# Patient Record
Sex: FEMALE | Race: WHITE | Employment: FULL TIME | URBAN - METROPOLITAN AREA
[De-identification: names, ages, dates, MRNs, and addresses within clinical notes are randomized per-mention and may not be internally consistent; named-entity substitution may affect disease eponyms.]

---

## 2017-08-07 ENCOUNTER — HOSPITAL ENCOUNTER (OUTPATIENT)
Dept: MAMMOGRAPHY | Age: 47
Discharge: HOME | End: 2017-08-07
Attending: OBSTETRICS & GYNECOLOGY
Payer: COMMERCIAL

## 2017-08-07 DIAGNOSIS — Z12.31 VISIT FOR SCREENING MAMMOGRAM: ICD-10-CM

## 2017-08-07 PROCEDURE — 77067 SCR MAMMO BI INCL CAD: CPT

## 2018-01-10 ENCOUNTER — HOME HEALTH ADMISSION (OUTPATIENT)
Dept: HOME HEALTH SERVICES | Facility: HOME HEALTH | Age: 48
End: 2018-01-10
Payer: COMMERCIAL

## 2018-01-12 ENCOUNTER — HOME CARE VISIT (OUTPATIENT)
Dept: SCHEDULING | Facility: HOME HEALTH | Age: 48
End: 2018-01-12
Payer: COMMERCIAL

## 2018-01-12 VITALS
OXYGEN SATURATION: 98 % | TEMPERATURE: 98 F | DIASTOLIC BLOOD PRESSURE: 70 MMHG | RESPIRATION RATE: 18 BRPM | SYSTOLIC BLOOD PRESSURE: 110 MMHG | HEART RATE: 66 BPM

## 2018-01-12 PROCEDURE — G0151 HHCP-SERV OF PT,EA 15 MIN: HCPCS

## 2018-01-12 PROCEDURE — 400013 HH SOC

## 2018-01-15 ENCOUNTER — HOME CARE VISIT (OUTPATIENT)
Dept: SCHEDULING | Facility: HOME HEALTH | Age: 48
End: 2018-01-15
Payer: COMMERCIAL

## 2018-01-15 VITALS
OXYGEN SATURATION: 98 % | TEMPERATURE: 96.6 F | HEART RATE: 85 BPM | DIASTOLIC BLOOD PRESSURE: 89 MMHG | SYSTOLIC BLOOD PRESSURE: 125 MMHG

## 2018-01-15 PROCEDURE — G0151 HHCP-SERV OF PT,EA 15 MIN: HCPCS

## 2018-01-17 ENCOUNTER — HOME CARE VISIT (OUTPATIENT)
Dept: SCHEDULING | Facility: HOME HEALTH | Age: 48
End: 2018-01-17
Payer: COMMERCIAL

## 2018-01-17 VITALS
RESPIRATION RATE: 16 BRPM | OXYGEN SATURATION: 98 % | SYSTOLIC BLOOD PRESSURE: 135 MMHG | TEMPERATURE: 96.5 F | DIASTOLIC BLOOD PRESSURE: 85 MMHG | HEART RATE: 90 BPM

## 2018-01-17 PROCEDURE — G0151 HHCP-SERV OF PT,EA 15 MIN: HCPCS

## 2018-01-22 ENCOUNTER — HOME CARE VISIT (OUTPATIENT)
Dept: SCHEDULING | Facility: HOME HEALTH | Age: 48
End: 2018-01-22
Payer: COMMERCIAL

## 2018-01-22 VITALS
TEMPERATURE: 96 F | DIASTOLIC BLOOD PRESSURE: 84 MMHG | SYSTOLIC BLOOD PRESSURE: 127 MMHG | OXYGEN SATURATION: 98 % | RESPIRATION RATE: 16 BRPM | HEART RATE: 76 BPM

## 2018-01-22 PROCEDURE — G0151 HHCP-SERV OF PT,EA 15 MIN: HCPCS

## 2018-01-23 PROCEDURE — A4649 SURGICAL SUPPLIES: HCPCS

## 2018-01-24 ENCOUNTER — HOME CARE VISIT (OUTPATIENT)
Dept: SCHEDULING | Facility: HOME HEALTH | Age: 48
End: 2018-01-24
Payer: COMMERCIAL

## 2018-01-24 VITALS
TEMPERATURE: 96.2 F | HEART RATE: 76 BPM | DIASTOLIC BLOOD PRESSURE: 83 MMHG | SYSTOLIC BLOOD PRESSURE: 113 MMHG | OXYGEN SATURATION: 98 % | RESPIRATION RATE: 16 BRPM

## 2018-01-24 PROCEDURE — G0151 HHCP-SERV OF PT,EA 15 MIN: HCPCS

## 2018-01-26 ENCOUNTER — HOME CARE VISIT (OUTPATIENT)
Dept: SCHEDULING | Facility: HOME HEALTH | Age: 48
End: 2018-01-26
Payer: COMMERCIAL

## 2018-01-26 VITALS
DIASTOLIC BLOOD PRESSURE: 83 MMHG | HEART RATE: 77 BPM | TEMPERATURE: 96.4 F | OXYGEN SATURATION: 97 % | SYSTOLIC BLOOD PRESSURE: 135 MMHG | RESPIRATION RATE: 16 BRPM

## 2018-01-26 PROCEDURE — G0151 HHCP-SERV OF PT,EA 15 MIN: HCPCS

## 2018-01-29 ENCOUNTER — HOME CARE VISIT (OUTPATIENT)
Dept: SCHEDULING | Facility: HOME HEALTH | Age: 48
End: 2018-01-29
Payer: COMMERCIAL

## 2018-01-29 VITALS
RESPIRATION RATE: 16 BRPM | DIASTOLIC BLOOD PRESSURE: 80 MMHG | TEMPERATURE: 96.9 F | OXYGEN SATURATION: 99 % | SYSTOLIC BLOOD PRESSURE: 103 MMHG | HEART RATE: 73 BPM

## 2018-01-29 PROCEDURE — G0151 HHCP-SERV OF PT,EA 15 MIN: HCPCS

## 2018-01-31 ENCOUNTER — HOME CARE VISIT (OUTPATIENT)
Dept: SCHEDULING | Facility: HOME HEALTH | Age: 48
End: 2018-01-31
Payer: COMMERCIAL

## 2018-01-31 VITALS
HEART RATE: 83 BPM | TEMPERATURE: 97 F | OXYGEN SATURATION: 98 % | SYSTOLIC BLOOD PRESSURE: 113 MMHG | RESPIRATION RATE: 16 BRPM | DIASTOLIC BLOOD PRESSURE: 83 MMHG

## 2018-01-31 PROCEDURE — A4649 SURGICAL SUPPLIES: HCPCS

## 2018-01-31 PROCEDURE — G0151 HHCP-SERV OF PT,EA 15 MIN: HCPCS

## 2018-02-01 NOTE — THERAPY EVALUATION
Klever Baez  : 1970  Primary: [employer]  Secondary:  [address] at [place] [id number] [facility], [address] [place] [address],[address], [address] [facility]  [phone]   [phone]          OUTPATIENT PHYSICAL THERAPY:Initial Assessment 2018    ICD-10: Treatment Diagnosis:     · Pain in right knee (M25.561)  · Stiffness of right knee, not elsewhere classified (M25.661)      Precautions/Allergies:   Review of patient's allergies indicates no known allergies. Fall Risk Score: 0 (? 5 = High Risk)  MD Orders: eval and treat MEDICAL/REFERRING DIAGNOSIS:  degenerative joint disease of lower leg   DATE OF ONSET: 3 weeks ago  REFERRING PHYSICIAN: Nora Dorsey MD  RETURN PHYSICIAN APPOINTMENT: unsure date     INITIAL ASSESSMENT:  Ms. Roldan Ear presents s/p R TKA. She is a very active person who will benefit from PT services to get back to full I with work, family, and recreational activities. PROBLEM LIST (Impacting functional limitations):  1. Decreased Strength  2. Decreased ADL/Functional Activities  3. Decreased Ambulation Ability/Technique  4. Increased Pain  5. Decreased Activity Tolerance  6. Decreased Flexibility/Joint Mobility  7. Decreased [place] with Home Exercise Program INTERVENTIONS PLANNED:  1. Cold  2. Cryotherapy  3. Electrical Stimulation  4. Gait Training  5. Heat  6. Home Exercise Program (HEP)  7. Manual Therapy  8. Range of Motion (ROM)  9. Therapeutic Activites  10. Therapeutic Exercise/Strengthening  11. Ultrasound (US)   TREATMENT PLAN:  Effective Dates: 18 TO 18. Frequency/Duration: 2 times a week for 8 weeks  GOALS: (Goals have been discussed and agreed upon with patient.)  Short-Term Functional Goals: Time Frame: 4 weeks  1. Pt will be I with phase appropriate HEP to assist with R ROM surgical knee. 2. Pt will improve extension knee to 0 degrees to assist with gait. Discharge Goals: Time Frame: 8 weeks  1.  Pt will improve R knee flexion to 125 degrees or greater to assist with stair climbing. 2. Pt will demonstrate an non-antalgic gait pattern without assistive device on level and uneven surfaces SPV. 3. Pt  Will improve strength R quads and hamstrings to 5/5 to assist with return to recreational activities. Rehabilitation Potential For Stated Goals: Good  Regarding Robert Montejo's therapy, I certify that the treatment plan above will be carried out by a therapist or under their direction. Thank you for this referral,  Cosme Ferguson PT     Referring Physician Signature: Edyta Francois MD              Date                    The information in this section was collected on 18 (except where otherwise noted). HISTORY:   History of Present Injury/Illness (Reason for Referral):  R TKA secondary to OA knee  Past Medical History/Comorbidities:   Ms. Subha Stevenson  has a past medical history of Allergic rhinitis; Arthritis; GERD (gastroesophageal reflux disease); and Laryngeal ulceration. Ms. Subha Stevenson  has a past surgical history that includes hx  section (); hx abdominal wall defect repair; and implant breast silicone/eq. Social History/Living Environment:     very active person with family living in private residence  Prior Level of Function/Work/Activity:  I all activities   Personal Factors:          Sex:  female        Age:  49 y.o. Overall Behavior:  motivated  Current Medications:       Current Outpatient Prescriptions:     ascorbic acid, vitamin C, (VITAMIN C) 500 mg tablet, Take 500 mg by mouth two (2) times a day., Disp: , Rfl:     aspirin delayed-release 81 mg tablet, Take 81 mg by mouth two (2) times a day., Disp: , Rfl:     acetaminophen (TYLENOL) 325 mg tablet, Take 325 mg by mouth every six (6) hours as needed for Pain., Disp: , Rfl:     docusate sodium (COLACE) 100 mg capsule, Take 100 mg by mouth daily. , Disp: , Rfl:     senna (SENNA) 8.6 mg tablet, Take 1 Tab by mouth daily., Disp: , Rfl:     traMADol (ULTRAM) 50 mg tablet, Take 50 mg by mouth every six (6) hours as needed for Pain., Disp: , Rfl:     folic acid (FOLVITE) 1 mg tablet, Take 1 mg by mouth daily. , Disp: , Rfl:     iron polysacch complex-b12-fa (FERREX 150 FORTE) capsule, Take 1 Cap by mouth daily. , Disp: , Rfl:     predniSONE (DELTASONE) 10 mg tablet, 4 PO QD x 2, 3 PO QD x 2, 2 PO QD x 2, 1 PO QD x 2, Disp: 20 Tab, Rfl: 0    HYDROcodone-homatropine (HYCODAN) 5-1.5 mg/5 mL (5 mL) syrup, Take 5 mL by mouth four (4) times daily. Max Daily Amount: 20 mL., Disp: 120 mL, Rfl: 0    omeprazole (PRILOSEC) 40 mg capsule, Take 1 Cap by mouth daily. , Disp: 90 Cap, Rfl: 3    diclofenac-misoprostol (ARTHROTEC 50)  mg-mcg per tablet, Take 1 Tab by mouth two (2) times a day. Indications: OSTEOARTHRITIS IN PATIENT AT HIGH GASTRIC ULCER RISK, Disp: 60 Tab, Rfl: 5    diphenhydrAMINE (BENADRYL ALLERGY) 25 mg tablet, Take 25 mg by mouth every six (6) hours as needed for Sleep., Disp: , Rfl:    Date Last Reviewed:  2/1/2018    Number of Personal Factors/Comorbidities that affect the Plan of Care: 0: LOW COMPLEXITY   EXAMINATION:   Observation/Orthostatic Postural Assessment:          Edema present: R knee above patella R 16, L 15, mid patella R 17.5, L 16, below patella R 16, L 15  ROM:          Knee:  Right: 5 to 90 from supine prior to treatment; 3 to 120 sitting after treatment  Left: 0 to 127  Strength:          Lower extremities:  Quads 5 degrees extensor lag R, L 5/5  Hamstrings 3+/5 R, 5/5 L  Hip ABD 4-/5 R, 5/5 L  gastocs 4-/5 R, 5/5 L  Functional Mobility:         Gait/Ambulation:  Using straight cane L hand. Antalgic gait, circumduct R LE swing with shortened stance phase R        Transfers:  Shifts L         Stairs:  Not tested today   Body Structures Involved:  1. Bones  2. Joints  3. Muscles  4. Ligaments Body Functions Affected:  1. Sensory/Pain  2.  Neuromusculoskeletal Activities and Participation Affected:  1. Mobility  2. Self Care  3. Domestic Life  4. Interpersonal Interactions and Relationships  5. Community, Social and [place]   Number of elements (examined above) that affect the Plan of Care: 3: MODERATE COMPLEXITY   CLINICAL PRESENTATION:   Presentation: Stable and uncomplicated: LOW COMPLEXITY   CLINICAL DECISION MAKING:   Outcome Measure: Tool Used: Lower Extremity Functional Scale (LEFS)  Score:  Initial: 28/80 Most Recent: X/80 (Date: -- )   Interpretation of Score: 20 questions each scored on a 5 point scale with 0 representing \"extreme difficulty or unable to perform\" and 4 representing \"no difficulty\". The lower the score, the greater the functional disability. 80/80 represents no disability. Minimal detectable change is 9 points. Score 80 79-63 62-48 47-32 31-16 15-1 0   Modifier CH CI CJ CK CL CM CN         Medical Necessity:   · Patient is expected to demonstrate progress in strength, range of motion and functional technique to increase ease with all functional activities. Reason for Services/Other Comments:  · Patient continues to require skilled intervention due to recent TKA. Use of outcome tool(s) and clinical judgement create a POC that gives a: Clear prediction of patient's progress: LOW COMPLEXITY            TREATMENT:   (In addition to Assessment/Re-Assessment sessions the following treatments were rendered)  Pre-treatment Symptoms/Complaints:  6/10 R knee  Pain: Initial:     6/10 R knee Post Session:  6/10 R knee     THERAPEUTIC EXERCISE: (15 minutes):  Exercises per grid below to improve mobility and strength. Required minimal visual, verbal and tactile cues to promote proper body alignment, promote proper body posture and promote proper body mechanics. Progressed resistance, range and repetitions as indicated.    Date:  2-2-18 Date:   Date:     Activity/Exercise Parameters Parameters Parameters   Seated knee flexion with over pressure 10sx10     Prone knee extension 10sx10     Quad sets 10sx10     SLR x20                        Manual: (15 minutes): mobilization to promote r knee flexion and extension, soft tissue hamstring, gastroc to improve muscle flexibility. [employer] Portal  Treatment/Session Assessment:    · Response to Treatment:  Initial evaluation complete. Pt verbalized understanding HEP. · Compliance with Program/Exercises: Will assess as treatment progresses. · Recommendations/Intent for next treatment session: \"Next visit will focus on advancements to more challenging activities\".   Total Treatment Duration:   10:30 to 11:30    Yusra Leroy, PT

## 2018-02-02 ENCOUNTER — HOSPITAL ENCOUNTER (OUTPATIENT)
Dept: PHYSICAL THERAPY | Age: 48
Discharge: HOME | End: 2018-02-02
Payer: COMMERCIAL

## 2018-02-02 PROCEDURE — 97110 THERAPEUTIC EXERCISES: CPT

## 2018-02-02 PROCEDURE — 97161 PT EVAL LOW COMPLEX 20 MIN: CPT

## 2018-02-05 PROCEDURE — A4649 SURGICAL SUPPLIES: HCPCS

## 2018-02-06 ENCOUNTER — HOSPITAL ENCOUNTER (OUTPATIENT)
Dept: PHYSICAL THERAPY | Age: 48
Discharge: HOME | End: 2018-02-06
Payer: COMMERCIAL

## 2018-02-06 PROCEDURE — 97140 MANUAL THERAPY 1/> REGIONS: CPT

## 2018-02-06 PROCEDURE — 97110 THERAPEUTIC EXERCISES: CPT

## 2018-02-06 NOTE — PROGRESS NOTES
Yates Angelucci  : 1970  Primary: [employer]  Secondary:  [address]  at [facility] [address] [place] [address], [facility]. [address].  [phone]   [phone]          OUTPATIENT PHYSICAL THERAPY:Daily Note 2018    ICD-10: Treatment Diagnosis:   · Pain in right knee (M25.561)  · Stiffness of right knee, not elsewhere classified (M25.661)      Precautions/Allergies:   Review of patient's allergies indicates no known allergies. Fall Risk Score: 0 (? 5 = High Risk)  MD Orders: eval and treat MEDICAL/REFERRING DIAGNOSIS:  degenerative joint disease of lower leg   DATE OF ONSET: 3 weeks ago  REFERRING PHYSICIAN: Selvin Guerra MD  RETURN PHYSICIAN APPOINTMENT: unsure date     INITIAL ASSESSMENT:  Ms. Siva Antoine presents s/p R TKA. She is a very active person who will benefit from PT services to get back to full I with work, family, and recreational activities. PROBLEM LIST (Impacting functional limitations):  1. Decreased Strength  2. Decreased ADL/Functional Activities  3. Decreased Ambulation Ability/Technique  4. Increased Pain  5. Decreased Activity Tolerance  6. Decreased Flexibility/Joint Mobility  7. Decreased [place] with Home Exercise Program INTERVENTIONS PLANNED:  1. Cold  2. Cryotherapy  3. Electrical Stimulation  4. Gait Training  5. Heat  6. Home Exercise Program (HEP)  7. Manual Therapy  8. Range of Motion (ROM)  9. Therapeutic Activites  10. Therapeutic Exercise/Strengthening  11. Ultrasound (US)   TREATMENT PLAN:  Effective Dates: 18 TO 18. Frequency/Duration: 2 times a week for 8 weeks  GOALS: (Goals have been discussed and agreed upon with patient.)  Short-Term Functional Goals: Time Frame: 4 weeks  1. Pt will be I with phase appropriate HEP to assist with R ROM surgical knee. 2. Pt will improve extension knee to 0 degrees to assist with gait. Discharge Goals: Time Frame: 8 weeks  1.  Pt will improve R knee flexion to 125 degrees or greater to assist with stair climbing. 2. Pt will demonstrate an non-antalgic gait pattern without assistive device on level and uneven surfaces SPV. 3. Pt  Will improve strength R quads and hamstrings to 5/5 to assist with return to recreational activities. Rehabilitation Potential For Stated Goals: Good  Regarding Mikhail Montejo's therapy, I certify that the treatment plan above will be carried out by a therapist or under their direction. Thank you for this referral,  Leslye Campbell PT     Referring Physician Signature: Tianna Shen MD              Date                    The information in this section was collected on 18 (except where otherwise noted). HISTORY:   History of Present Injury/Illness (Reason for Referral):  R TKA secondary to OA knee  Past Medical History/Comorbidities:   Ms. Sree Pritchard  has a past medical history of Allergic rhinitis; Arthritis; GERD (gastroesophageal reflux disease); and Laryngeal ulceration. Ms. Sree Pritchard  has a past surgical history that includes hx  section (); hx abdominal wall defect repair; and implant breast silicone/eq. Social History/Living Environment:     very active person with family living in private residence  Prior Level of Function/Work/Activity:  I all activities   Personal Factors:          Sex:  female        Age:  49 y.o. Overall Behavior:  motivated  Current Medications:       Current Outpatient Prescriptions:     ascorbic acid, vitamin C, (VITAMIN C) 500 mg tablet, Take 500 mg by mouth two (2) times a day., Disp: , Rfl:     aspirin delayed-release 81 mg tablet, Take 81 mg by mouth two (2) times a day., Disp: , Rfl:     acetaminophen (TYLENOL) 325 mg tablet, Take 325 mg by mouth every six (6) hours as needed for Pain., Disp: , Rfl:     docusate sodium (COLACE) 100 mg capsule, Take 100 mg by mouth daily. , Disp: , Rfl:     senna (SENNA) 8.6 mg tablet, Take 1 Tab by mouth daily. , Disp: , Rfl:     traMADol (ULTRAM) 50 mg tablet, Take 50 mg by mouth every six (6) hours as needed for Pain., Disp: , Rfl:     folic acid (FOLVITE) 1 mg tablet, Take 1 mg by mouth daily. , Disp: , Rfl:     iron polysacch complex-b12-fa (FERREX 150 FORTE) capsule, Take 1 Cap by mouth daily. , Disp: , Rfl:     predniSONE (DELTASONE) 10 mg tablet, 4 PO QD x 2, 3 PO QD x 2, 2 PO QD x 2, 1 PO QD x 2, Disp: 20 Tab, Rfl: 0    HYDROcodone-homatropine (HYCODAN) 5-1.5 mg/5 mL (5 mL) syrup, Take 5 mL by mouth four (4) times daily. Max Daily Amount: 20 mL., Disp: 120 mL, Rfl: 0    omeprazole (PRILOSEC) 40 mg capsule, Take 1 Cap by mouth daily. , Disp: 90 Cap, Rfl: 3    diclofenac-misoprostol (ARTHROTEC 50)  mg-mcg per tablet, Take 1 Tab by mouth two (2) times a day. Indications: OSTEOARTHRITIS IN PATIENT AT HIGH GASTRIC ULCER RISK, Disp: 60 Tab, Rfl: 5    diphenhydrAMINE (BENADRYL ALLERGY) 25 mg tablet, Take 25 mg by mouth every six (6) hours as needed for Sleep., Disp: , Rfl:    Date Last Reviewed:  2/6/2018    Number of Personal Factors/Comorbidities that affect the Plan of Care: 0: LOW COMPLEXITY   EXAMINATION:   Observation/Orthostatic Postural Assessment:          Edema present: R knee above patella R 16, L 15, mid patella R 17.5, L 16, below patella R 16, L 15  ROM:          Knee:  Right: 5 to 90 from supine prior to treatment; 3 to 120 sitting after treatment  Left: 0 to 127  Strength:          Lower extremities:  Quads 5 degrees extensor lag R, L 5/5  Hamstrings 3+/5 R, 5/5 L  Hip ABD 4-/5 R, 5/5 L  gastocs 4-/5 R, 5/5 L  Functional Mobility:         Gait/Ambulation:  Using straight cane L hand. Antalgic gait, circumduct R LE swing with shortened stance phase R        Transfers:  Shifts L         Stairs:  Not tested today   Body Structures Involved:  1. Bones  2. Joints  3. Muscles  4. Ligaments Body Functions Affected:  1. Sensory/Pain  2.  Neuromusculoskeletal Activities and Participation Affected:  1. Mobility  2. Self Care  3. Domestic Life  4. Interpersonal Interactions and Relationships  5. Community, Social and [place]   Number of elements (examined above) that affect the Plan of Care: 3: MODERATE COMPLEXITY   CLINICAL PRESENTATION:   Presentation: Stable and uncomplicated: LOW COMPLEXITY   CLINICAL DECISION MAKING:   Outcome Measure: Tool Used: Lower Extremity Functional Scale (LEFS)  Score:  Initial: 28/80 Most Recent: X/80 (Date: -- )   Interpretation of Score: 20 questions each scored on a 5 point scale with 0 representing \"extreme difficulty or unable to perform\" and 4 representing \"no difficulty\". The lower the score, the greater the functional disability. 80/80 represents no disability. Minimal detectable change is 9 points. Score 80 79-63 62-48 47-32 31-16 15-1 0   Modifier CH CI CJ CK CL CM CN         Medical Necessity:   · Patient is expected to demonstrate progress in strength, range of motion and functional technique to increase ease with all functional activities. Reason for Services/Other Comments:  · Patient continues to require skilled intervention due to recent TKA. Use of outcome tool(s) and clinical judgement create a POC that gives a: Clear prediction of patient's progress: LOW COMPLEXITY            TREATMENT:   (In addition to Assessment/Re-Assessment sessions the following treatments were rendered)  Pre-treatment Symptoms/Complaints:  6/10 R knee  Pain: Initial:     6/10 R knee Post Session:  6/10 R knee     THERAPEUTIC EXERCISE: (20 minutes):  Exercises per grid below to improve mobility and strength. Required minimal visual, verbal and tactile cues to promote proper body alignment, promote proper body posture and promote proper body mechanics. Progressed resistance, range and repetitions as indicated.    Date:  2-2-18 Date:  2-6-18 Date:     Activity/Exercise Parameters Parameters Parameters   Seated knee flexion with over pressure 10sx10 10sx15    Prone knee extension  10sx10 10sx10    Quad sets 10sx10 10sx10    SLR all 4 planes x20 3x10    Nustep  8 min L2                 Manual: (25 minutes): mobilization to promote r knee flexion and extension, soft tissue hamstring, gastroc to improve muscle flexibility. Modalities: (10 minutes): pneumatic ice machine L knee. Skin intact following. [employer] Portal  Treatment/Session Assessment:    · Response to Treatment:  Pt verbalized understanding HEP. · Compliance with Program/Exercises: Will assess as treatment progresses. · Recommendations/Intent for next treatment session: \"Next visit will focus on advancements to more challenging activities\".   Total Treatment Duration:  PT Patient Time In/Time Out  Time In: 0985  Time Out: Kathleen Silva., PT

## 2018-02-08 ENCOUNTER — HOSPITAL ENCOUNTER (OUTPATIENT)
Dept: PHYSICAL THERAPY | Age: 48
Discharge: HOME | End: 2018-02-08
Payer: COMMERCIAL

## 2018-02-08 PROCEDURE — 97140 MANUAL THERAPY 1/> REGIONS: CPT

## 2018-02-08 PROCEDURE — 97110 THERAPEUTIC EXERCISES: CPT

## 2018-02-08 NOTE — PROGRESS NOTES
Heywood Barthel  : 1970  Primary: [employer]  Secondary:  [address] at [place] [id number] [facility], [address] [place] [address],[address], [address] [facility]  [phone]   [phone]          OUTPATIENT PHYSICAL THERAPY:Daily Note 2018    ICD-10: Treatment Diagnosis:   · Pain in right knee (M25.561)  · Stiffness of right knee, not elsewhere classified (M25.661)      Precautions/Allergies:   Review of patient's allergies indicates no known allergies. Fall Risk Score: 0 (? 5 = High Risk)  MD Orders: eval and treat MEDICAL/REFERRING DIAGNOSIS:  degenerative joint disease of lower leg   DATE OF ONSET: 3 weeks ago  REFERRING PHYSICIAN: Courtney Tanner MD  RETURN PHYSICIAN APPOINTMENT: unsure date     INITIAL ASSESSMENT:  Ms. Celestine Meléndez presents s/p R TKA. She is a very active person who will benefit from PT services to get back to full I with work, family, and recreational activities. PROBLEM LIST (Impacting functional limitations):  1. Decreased Strength  2. Decreased ADL/Functional Activities  3. Decreased Ambulation Ability/Technique  4. Increased Pain  5. Decreased Activity Tolerance  6. Decreased Flexibility/Joint Mobility  7. Decreased [place] with Home Exercise Program INTERVENTIONS PLANNED:  1. Cold  2. Cryotherapy  3. Electrical Stimulation  4. Gait Training  5. Heat  6. Home Exercise Program (HEP)  7. Manual Therapy  8. Range of Motion (ROM)  9. Therapeutic Activites  10. Therapeutic Exercise/Strengthening  11. Ultrasound (US)   TREATMENT PLAN:  Effective Dates: 18 TO 18. Frequency/Duration: 2 times a week for 8 weeks  GOALS: (Goals have been discussed and agreed upon with patient.)  Short-Term Functional Goals: Time Frame: 4 weeks  1. Pt will be I with phase appropriate HEP to assist with R ROM surgical knee. 2. Pt will improve extension knee to 0 degrees to assist with gait. Discharge Goals: Time Frame: 8 weeks  1.  Pt will improve R knee flexion to 125 degrees or greater to assist with stair climbing. 2. Pt will demonstrate an non-antalgic gait pattern without assistive device on level and uneven surfaces SPV. 3. Pt  Will improve strength R quads and hamstrings to 5/5 to assist with return to recreational activities. Rehabilitation Potential For Stated Goals: Good  Regarding Jailyn Montejo's therapy, I certify that the treatment plan above will be carried out by a therapist or under their direction. Thank you for this referral,  Porsha Shaver PT     Referring Physician Signature: Seda Mortensen MD              Date                    The information in this section was collected on 18 (except where otherwise noted). HISTORY:   History of Present Injury/Illness (Reason for Referral):  R TKA secondary to OA knee  Past Medical History/Comorbidities:   Ms. Shar Leach  has a past medical history of Allergic rhinitis; Arthritis; GERD (gastroesophageal reflux disease); and Laryngeal ulceration. Ms. Shar Leach  has a past surgical history that includes hx  section (); hx abdominal wall defect repair; and implant breast silicone/eq. Social History/Living Environment:     very active person with family living in private residence  Prior Level of Function/Work/Activity:  I all activities   Personal Factors:          Sex:  female        Age:  49 y.o. Overall Behavior:  motivated  Current Medications:       Current Outpatient Prescriptions:     ascorbic acid, vitamin C, (VITAMIN C) 500 mg tablet, Take 500 mg by mouth two (2) times a day., Disp: , Rfl:     aspirin delayed-release 81 mg tablet, Take 81 mg by mouth two (2) times a day., Disp: , Rfl:     acetaminophen (TYLENOL) 325 mg tablet, Take 325 mg by mouth every six (6) hours as needed for Pain., Disp: , Rfl:     docusate sodium (COLACE) 100 mg capsule, Take 100 mg by mouth daily. , Disp: , Rfl:     senna (SENNA) 8.6 mg tablet, Take 1 Tab by mouth daily. , Disp: , Rfl:     traMADol (ULTRAM) 50 mg tablet, Take 50 mg by mouth every six (6) hours as needed for Pain., Disp: , Rfl:     folic acid (FOLVITE) 1 mg tablet, Take 1 mg by mouth daily. , Disp: , Rfl:     iron polysacch complex-b12-fa (FERREX 150 FORTE) capsule, Take 1 Cap by mouth daily. , Disp: , Rfl:     predniSONE (DELTASONE) 10 mg tablet, 4 PO QD x 2, 3 PO QD x 2, 2 PO QD x 2, 1 PO QD x 2, Disp: 20 Tab, Rfl: 0    HYDROcodone-homatropine (HYCODAN) 5-1.5 mg/5 mL (5 mL) syrup, Take 5 mL by mouth four (4) times daily. Max Daily Amount: 20 mL., Disp: 120 mL, Rfl: 0    omeprazole (PRILOSEC) 40 mg capsule, Take 1 Cap by mouth daily. , Disp: 90 Cap, Rfl: 3    diclofenac-misoprostol (ARTHROTEC 50)  mg-mcg per tablet, Take 1 Tab by mouth two (2) times a day. Indications: OSTEOARTHRITIS IN PATIENT AT HIGH GASTRIC ULCER RISK, Disp: 60 Tab, Rfl: 5    diphenhydrAMINE (BENADRYL ALLERGY) 25 mg tablet, Take 25 mg by mouth every six (6) hours as needed for Sleep., Disp: , Rfl:    Date Last Reviewed:  2/8/2018    Number of Personal Factors/Comorbidities that affect the Plan of Care: 0: LOW COMPLEXITY   EXAMINATION:   Observation/Orthostatic Postural Assessment:          Edema present: R knee above patella R 16, L 15, mid patella R 17.5, L 16, below patella R 16, L 15  ROM:          Knee:  Right: 5 to 90 from supine prior to treatment; 3 to 120 sitting after treatment  Left: 0 to 127  Strength:          Lower extremities:  Quads 5 degrees extensor lag R, L 5/5  Hamstrings 3+/5 R, 5/5 L  Hip ABD 4-/5 R, 5/5 L  gastocs 4-/5 R, 5/5 L  Functional Mobility:         Gait/Ambulation:  Using straight cane L hand. Antalgic gait, circumduct R LE swing with shortened stance phase R        Transfers:  Shifts L         Stairs:  Not tested today   Body Structures Involved:  1. Bones  2. Joints  3. Muscles  4. Ligaments Body Functions Affected:  1. Sensory/Pain  2.  Neuromusculoskeletal Activities and Participation Affected:  1. Mobility  2. Self Care  3. Domestic Life  4. Interpersonal Interactions and Relationships  5. Community, Social and [place]   Number of elements (examined above) that affect the Plan of Care: 3: MODERATE COMPLEXITY   CLINICAL PRESENTATION:   Presentation: Stable and uncomplicated: LOW COMPLEXITY   CLINICAL DECISION MAKING:   Outcome Measure: Tool Used: Lower Extremity Functional Scale (LEFS)  Score:  Initial: 28/80 Most Recent: X/80 (Date: -- )   Interpretation of Score: 20 questions each scored on a 5 point scale with 0 representing \"extreme difficulty or unable to perform\" and 4 representing \"no difficulty\". The lower the score, the greater the functional disability. 80/80 represents no disability. Minimal detectable change is 9 points. Score 80 79-63 62-48 47-32 31-16 15-1 0   Modifier CH CI CJ CK CL CM CN         Medical Necessity:   · Patient is expected to demonstrate progress in strength, range of motion and functional technique to increase ease with all functional activities. Reason for Services/Other Comments:  · Patient continues to require skilled intervention due to recent TKA. Use of outcome tool(s) and clinical judgement create a POC that gives a: Clear prediction of patient's progress: LOW COMPLEXITY            TREATMENT:   (In addition to Assessment/Re-Assessment sessions the following treatments were rendered)  Pre-treatment Symptoms/Complaints:  6/10 R knee  Pain: Initial:     6/10 R knee Post Session:  6/10 R knee     THERAPEUTIC EXERCISE: (20 minutes):  Exercises per grid below to improve mobility and strength. Required minimal visual, verbal and tactile cues to promote proper body alignment, promote proper body posture and promote proper body mechanics. Progressed resistance, range and repetitions as indicated.    Date:  2-8-18 Date:     Activity/Exercise Parameters Parameters   Seated knee flexion with over pressure 10sx15    Prone knee extension  10sx10    Quad sets 10sx10    SLR all 4 planes 3x10    Nustep 8 min L2               Manual: (25 minutes): mobilization to promote r knee flexion and extension, soft tissue hamstring, gastroc to improve muscle flexibility. Modalities: (10 minutes): pneumatic ice machine L knee. Skin intact following. [employer] [facility]  Treatment/Session Assessment:    · Response to Treatment:  Pt verbalized understanding HEP. · Compliance with Program/Exercises: compliant. · Recommendations/Intent for next treatment session: \"Next visit will focus on advancements to more challenging activities\".   Total Treatment Duration:  PT Patient Time In/Time Out  Time In: 0945  Time Out: FERNANDEZ Avelar

## 2018-02-13 ENCOUNTER — HOSPITAL ENCOUNTER (OUTPATIENT)
Dept: PHYSICAL THERAPY | Age: 48
Discharge: HOME | End: 2018-02-13
Payer: COMMERCIAL

## 2018-02-13 PROCEDURE — 97110 THERAPEUTIC EXERCISES: CPT

## 2018-02-13 PROCEDURE — 97140 MANUAL THERAPY 1/> REGIONS: CPT

## 2018-02-13 NOTE — PROGRESS NOTES
Emanuel Day  : 1970  Primary: [employer]  Secondary:  [address] at [place] [id number] [facility], [address], [facility] [address]  [phone]   [phone]          OUTPATIENT PHYSICAL THERAPY:Daily Note 2018    ICD-10: Treatment Diagnosis:   Precautions/Allergies:   Review of patient's allergies indicates no known allergies. Fall Risk Score: 0 (? 5 = High Risk)  MD Orders: eval and treat MEDICAL/REFERRING DIAGNOSIS:  degenerative joint disease of lower leg   DATE OF ONSET: 3 weeks ago  REFERRING PHYSICIAN: Tressa Ward MD  RETURN PHYSICIAN APPOINTMENT: unsure date     INITIAL ASSESSMENT:  Ms. Blanca Carter presents s/p R TKA. She is a very active person who will benefit from PT services to get back to full I with work, family, and recreational activities. PROBLEM LIST (Impacting functional limitations):  1. Decreased Strength  2. Decreased ADL/Functional Activities  3. Decreased Ambulation Ability/Technique  4. Increased Pain  5. Decreased Activity Tolerance  6. Decreased Flexibility/Joint Mobility  7. Decreased [place] with Home Exercise Program INTERVENTIONS PLANNED:  1. Cold  2. Cryotherapy  3. Electrical Stimulation  4. Gait Training  5. Heat  6. Home Exercise Program (HEP)  7. Manual Therapy  8. Range of Motion (ROM)  9. Therapeutic Activites  10. Therapeutic Exercise/Strengthening  11. Ultrasound (US)   TREATMENT PLAN:  Effective Dates: 18 TO 18. Frequency/Duration: 2 times a week for 8 weeks  GOALS: (Goals have been discussed and agreed upon with patient.)  Short-Term Functional Goals: Time Frame: 4 weeks  1. Pt will be I with phase appropriate HEP to assist with R ROM surgical knee. 2. Pt will improve extension knee to 0 degrees to assist with gait. Discharge Goals: Time Frame: 8 weeks  1. Pt will improve R knee flexion to 125 degrees or greater to assist with stair climbing.   2. Pt will demonstrate an non-antalgic gait pattern without assistive device on level and uneven surfaces SPV. 3. Pt  Will improve strength R quads and hamstrings to 5/5 to assist with return to recreational activities. Rehabilitation Potential For Stated Goals: Good  Regarding Dariana Montejo's therapy, I certify that the treatment plan above will be carried out by a therapist or under their direction. Thank you for this referral,  Kamron Hill PT     Referring Physician Signature: Osbaldo Conner MD              Date                    The information in this section was collected on 18 (except where otherwise noted). HISTORY:   History of Present Injury/Illness (Reason for Referral):  R TKA secondary to OA knee  Past Medical History/Comorbidities:   Ms. Curtis Flores  has a past medical history of Allergic rhinitis; Arthritis; GERD (gastroesophageal reflux disease); and Laryngeal ulceration. Ms. Curtis Flores  has a past surgical history that includes hx  section (); hx abdominal wall defect repair; and implant breast silicone/eq. Social History/Living Environment:     very active person with family living in private residence  Prior Level of Function/Work/Activity:  I all activities   Personal Factors:          Sex:  female        Age:  49 y.o. Overall Behavior:  motivated  Current Medications:       Current Outpatient Prescriptions:     ascorbic acid, vitamin C, (VITAMIN C) 500 mg tablet, Take 500 mg by mouth two (2) times a day., Disp: , Rfl:     aspirin delayed-release 81 mg tablet, Take 81 mg by mouth two (2) times a day., Disp: , Rfl:     acetaminophen (TYLENOL) 325 mg tablet, Take 325 mg by mouth every six (6) hours as needed for Pain., Disp: , Rfl:     docusate sodium (COLACE) 100 mg capsule, Take 100 mg by mouth daily. , Disp: , Rfl:     senna (SENNA) 8.6 mg tablet, Take 1 Tab by mouth daily. , Disp: , Rfl:     traMADol (ULTRAM) 50 mg tablet, Take 50 mg by mouth every six (6) hours as needed for Pain., Disp: , Rfl:     folic acid (FOLVITE) 1 mg tablet, Take 1 mg by mouth daily. , Disp: , Rfl:     iron polysacch complex-b12-fa (FERREX 150 FORTE) capsule, Take 1 Cap by mouth daily. , Disp: , Rfl:     predniSONE (DELTASONE) 10 mg tablet, 4 PO QD x 2, 3 PO QD x 2, 2 PO QD x 2, 1 PO QD x 2, Disp: 20 Tab, Rfl: 0    HYDROcodone-homatropine (HYCODAN) 5-1.5 mg/5 mL (5 mL) syrup, Take 5 mL by mouth four (4) times daily. Max Daily Amount: 20 mL., Disp: 120 mL, Rfl: 0    omeprazole (PRILOSEC) 40 mg capsule, Take 1 Cap by mouth daily. , Disp: 90 Cap, Rfl: 3    diclofenac-misoprostol (ARTHROTEC 50)  mg-mcg per tablet, Take 1 Tab by mouth two (2) times a day. Indications: OSTEOARTHRITIS IN PATIENT AT HIGH GASTRIC ULCER RISK, Disp: 60 Tab, Rfl: 5    diphenhydrAMINE (BENADRYL ALLERGY) 25 mg tablet, Take 25 mg by mouth every six (6) hours as needed for Sleep., Disp: , Rfl:    Date Last Reviewed:  2/13/2018    Number of Personal Factors/Comorbidities that affect the Plan of Care: 0: LOW COMPLEXITY   EXAMINATION:   Observation/Orthostatic Postural Assessment:          Edema present: R knee above patella R 16, L 15, mid patella R 17.5, L 16, below patella R 16, L 15  ROM:          Knee:  Right: 5 to 90 from supine prior to treatment; 3 to 120 sitting after treatment  Left: 0 to 127  Strength:          Lower extremities:  Quads 5 degrees extensor lag R, L 5/5  Hamstrings 3+/5 R, 5/5 L  Hip ABD 4-/5 R, 5/5 L  gastocs 4-/5 R, 5/5 L  Functional Mobility:         Gait/Ambulation:  Using straight cane L hand. Antalgic gait, circumduct R LE swing with shortened stance phase R        Transfers:  Shifts L         Stairs:  Not tested today   Body Structures Involved:  1. Bones  2. Joints  3. Muscles  4. Ligaments Body Functions Affected:  1. Sensory/Pain  2. Neuromusculoskeletal Activities and Participation Affected:  1. Mobility  2. Self Care  3. Domestic Life  4.  Interpersonal Interactions and Relationships  5. Community, Social and [place]   Number of elements (examined above) that affect the Plan of Care: 3: MODERATE COMPLEXITY   CLINICAL PRESENTATION:   Presentation: Stable and uncomplicated: LOW COMPLEXITY   CLINICAL DECISION MAKING:   Outcome Measure: Tool Used: Lower Extremity Functional Scale (LEFS)  Score:  Initial: 28/80 Most Recent: X/80 (Date: -- )   Interpretation of Score: 20 questions each scored on a 5 point scale with 0 representing \"extreme difficulty or unable to perform\" and 4 representing \"no difficulty\". The lower the score, the greater the functional disability. 80/80 represents no disability. Minimal detectable change is 9 points. Score 80 79-63 62-48 47-32 31-16 15-1 0   Modifier CH CI CJ CK CL CM CN         Medical Necessity:   · Patient is expected to demonstrate progress in strength, range of motion and functional technique to increase ease with all functional activities. Reason for Services/Other Comments:  · Patient continues to require skilled intervention due to recent TKA. Use of outcome tool(s) and clinical judgement create a POC that gives a: Clear prediction of patient's progress: LOW COMPLEXITY            TREATMENT:   (In addition to Assessment/Re-Assessment sessions the following treatments were rendered)  Pre-treatment Symptoms/Complaints:  Pt states her knee feels stiff and swollen after riding to [place] and back this weekend. Pain: Initial:     6/10 R knee Post Session:  6/10 R knee     THERAPEUTIC EXERCISE: (30 minutes):  Exercises per grid below to improve mobility and strength. Required minimal visual, verbal and tactile cues to promote proper body alignment, promote proper body posture and promote proper body mechanics. Progressed resistance, range and repetitions as indicated.    Date:  2-8-18 Date:  02-13-18   Activity/Exercise Parameters Parameters   Seated knee flexion with over pressure 10sx15    Prone knee extension  10sx10    Quad sets 10sx10 15 reps  5 sec holds   SLR all 4 planes 3x10    Nustep 8 min L2 Level 3  6 minutes   Standing Heel/Toe Raises  20 reps each   Knee Flexion Stretch on Step  10 reps  10 sec holds   TKE with T-band  Black  20 reps   Gastroc Slantboard  3 reps  20 sec holds   Hamstring Stretch  3 reps  20 sec holds   Heel Slides with Strap for OP  15 reps  5 sec holds    Manual: (15 minutes): mobilization to promote r knee flexion and extension, soft tissue hamstring, gastroc to improve muscle flexibility. Modalities: (10 minutes): pneumatic ice machine L knee. Skin intact following. [employer] [facility]  Treatment/Session Assessment:    · Response to Treatment:  Pt tolerated all treatments well. Increased swelling and stiffness noted today. ROM improved with therex and manual therapy. · Compliance with Program/Exercises: compliant. · Recommendations/Intent for next treatment session: \"Next visit will focus on advancements to more challenging activities\".   Total Treatment Duration:  55 minutes  PT Patient Time In/Time Out  Time In: 1100  Time Out: [address] [place]

## 2018-02-15 ENCOUNTER — HOSPITAL ENCOUNTER (OUTPATIENT)
Dept: PHYSICAL THERAPY | Age: 48
Discharge: HOME | End: 2018-02-15
Payer: COMMERCIAL

## 2018-02-15 PROCEDURE — 97140 MANUAL THERAPY 1/> REGIONS: CPT

## 2018-02-15 PROCEDURE — 97110 THERAPEUTIC EXERCISES: CPT

## 2018-02-15 NOTE — PROGRESS NOTES
Stuart Matute  : 1970  Primary: Bri[employer]  Secondary:  [address] at [place] [id number] [facility], [address], [facility].  [phone]   [phone]          OUTPATIENT PHYSICAL THERAPY:Daily Note 2/15/2018    ICD-10: Treatment Diagnosis:   Precautions/Allergies:   Review of patient's allergies indicates no known allergies. Fall Risk Score: 0 (? 5 = High Risk)  MD Orders: eval and treat MEDICAL/REFERRING DIAGNOSIS:  degenerative joint disease of lower leg   DATE OF ONSET: 3 weeks ago  REFERRING PHYSICIAN: Rox Lanes, MD  RETURN PHYSICIAN APPOINTMENT: unsure date     INITIAL ASSESSMENT:  Ms. Karolyn Salguero presents s/p R TKA. She is a very active person who will benefit from PT services to get back to full I with work, family, and recreational activities. PROBLEM LIST (Impacting functional limitations):  1. Decreased Strength  2. Decreased ADL/Functional Activities  3. Decreased Ambulation Ability/Technique  4. Increased Pain  5. Decreased Activity Tolerance  6. Decreased Flexibility/Joint Mobility  7. Decreased [place] with Home Exercise Program INTERVENTIONS PLANNED:  1. Cold  2. Cryotherapy  3. Electrical Stimulation  4. Gait Training  5. Heat  6. Home Exercise Program (HEP)  7. Manual Therapy  8. Range of Motion (ROM)  9. Therapeutic Activites  10. Therapeutic Exercise/Strengthening  11. Ultrasound (US)   TREATMENT PLAN:  Effective Dates: 18 TO 18. Frequency/Duration: 2 times a week for 8 weeks  GOALS: (Goals have been discussed and agreed upon with patient.)  Short-Term Functional Goals: Time Frame: 4 weeks  1. Pt will be I with phase appropriate HEP to assist with R ROM surgical knee. 2. Pt will improve extension knee to 0 degrees to assist with gait. Discharge Goals: Time Frame: 8 weeks  1. Pt will improve R knee flexion to 125 degrees or greater to assist with stair climbing.   2. Pt will demonstrate an non-antalgic gait pattern without assistive device on level and uneven surfaces SPV. 3. Pt  Will improve strength R quads and hamstrings to 5/5 to assist with return to recreational activities. Rehabilitation Potential For Stated Goals: Good  Regarding Valentín Barrera Vinayakdavidirena's therapy, I certify that the treatment plan above will be carried out by a therapist or under their direction. Thank you for this referral,  Debbie Srivastava PT     Referring Physician Signature: Cj Davis MD              Date                    The information in this section was collected on 18 (except where otherwise noted). HISTORY:   History of Present Injury/Illness (Reason for Referral):  R TKA secondary to OA knee  Past Medical History/Comorbidities:   Ms. Angus Merlin  has a past medical history of Allergic rhinitis; Arthritis; GERD (gastroesophageal reflux disease); and Laryngeal ulceration. Ms. Angus Merlin  has a past surgical history that includes hx  section (); hx abdominal wall defect repair; and implant breast silicone/eq. Social History/Living Environment:     very active person with family living in private residence  Prior Level of Function/Work/Activity:  I all activities   Personal Factors:          Sex:  female        Age:  49 y.o. Overall Behavior:  motivated  Current Medications:       Current Outpatient Prescriptions:     ascorbic acid, vitamin C, (VITAMIN C) 500 mg tablet, Take 500 mg by mouth two (2) times a day., Disp: , Rfl:     aspirin delayed-release 81 mg tablet, Take 81 mg by mouth two (2) times a day., Disp: , Rfl:     acetaminophen (TYLENOL) 325 mg tablet, Take 325 mg by mouth every six (6) hours as needed for Pain., Disp: , Rfl:     docusate sodium (COLACE) 100 mg capsule, Take 100 mg by mouth daily. , Disp: , Rfl:     senna (SENNA) 8.6 mg tablet, Take 1 Tab by mouth daily. , Disp: , Rfl:     traMADol (ULTRAM) 50 mg tablet, Take 50 mg by mouth every six (6) hours as needed for Pain., Disp: , Rfl:     folic acid (FOLVITE) 1 mg tablet, Take 1 mg by mouth daily. , Disp: , Rfl:     iron polysacch complex-b12-fa (FERREX 150 FORTE) capsule, Take 1 Cap by mouth daily. , Disp: , Rfl:     predniSONE (DELTASONE) 10 mg tablet, 4 PO QD x 2, 3 PO QD x 2, 2 PO QD x 2, 1 PO QD x 2, Disp: 20 Tab, Rfl: 0    HYDROcodone-homatropine (HYCODAN) 5-1.5 mg/5 mL (5 mL) syrup, Take 5 mL by mouth four (4) times daily. Max Daily Amount: 20 mL., Disp: 120 mL, Rfl: 0    omeprazole (PRILOSEC) 40 mg capsule, Take 1 Cap by mouth daily. , Disp: 90 Cap, Rfl: 3    diclofenac-misoprostol (ARTHROTEC 50)  mg-mcg per tablet, Take 1 Tab by mouth two (2) times a day. Indications: OSTEOARTHRITIS IN PATIENT AT HIGH GASTRIC ULCER RISK, Disp: 60 Tab, Rfl: 5    diphenhydrAMINE (BENADRYL ALLERGY) 25 mg tablet, Take 25 mg by mouth every six (6) hours as needed for Sleep., Disp: , Rfl:    Date Last Reviewed:  2/15/2018    Number of Personal Factors/Comorbidities that affect the Plan of Care: 0: LOW COMPLEXITY   EXAMINATION:   Observation/Orthostatic Postural Assessment:          Edema present: R knee above patella R 16, L 15, mid patella R 17.5, L 16, below patella R 16, L 15  ROM:          Knee:  Right: 5 to 90 from supine prior to treatment; 0 to 122 supine after treatment AAROM with OP  Left: 0 to 127  Strength:          Lower extremities:  Quads 5 degrees extensor lag R, L 5/5  Hamstrings 3+/5 R, 5/5 L  Hip ABD 4-/5 R, 5/5 L  gastocs 4-/5 R, 5/5 L  Functional Mobility:         Gait/Ambulation:  Using straight cane L hand. Antalgic gait, circumduct R LE swing with shortened stance phase R        Transfers:  Shifts L         Stairs:  Not tested today   Body Structures Involved:  1. Bones  2. Joints  3. Muscles  4. Ligaments Body Functions Affected:  1. Sensory/Pain  2. Neuromusculoskeletal Activities and Participation Affected:  1. Mobility  2. Self Care  3. Domestic Life  4.  Interpersonal Interactions and Relationships  5. Community, Social and [place]   Number of elements (examined above) that affect the Plan of Care: 3: MODERATE COMPLEXITY   CLINICAL PRESENTATION:   Presentation: Stable and uncomplicated: LOW COMPLEXITY   CLINICAL DECISION MAKING:   Outcome Measure: Tool Used: Lower Extremity Functional Scale (LEFS)  Score:  Initial: 28/80 Most Recent: X/80 (Date: -- )   Interpretation of Score: 20 questions each scored on a 5 point scale with 0 representing \"extreme difficulty or unable to perform\" and 4 representing \"no difficulty\". The lower the score, the greater the functional disability. 80/80 represents no disability. Minimal detectable change is 9 points. Score 80 79-63 62-48 47-32 31-16 15-1 0   Modifier CH CI CJ CK CL CM CN         Medical Necessity:   · Patient is expected to demonstrate progress in strength, range of motion and functional technique to increase ease with all functional activities. Reason for Services/Other Comments:  · Patient continues to require skilled intervention due to recent TKA. Use of outcome tool(s) and clinical judgement create a POC that gives a: Clear prediction of patient's progress: LOW COMPLEXITY            TREATMENT:   (In addition to Assessment/Re-Assessment sessions the following treatments were rendered)  Pre-treatment Symptoms/Complaints:  Pt states she slept good last night. Pain: Initial:     4-5/10 R knee Post Session:  4/10 R knee     THERAPEUTIC EXERCISE: (30 minutes):  Exercises per grid below to improve mobility and strength. Required minimal visual, verbal and tactile cues to promote proper body alignment, promote proper body posture and promote proper body mechanics. Progressed resistance, range and repetitions as indicated.    Date:  2-8-18 Date:  02-13-18 Date  02-15-18   Activity/Exercise Parameters Parameters    Seated knee flexion with over pressure 10sx15     Prone knee extension  10sx10     Quad sets 10sx10 15 reps  5 sec holds 15 reps  5 sec holds   SLR all 4 planes 3x10     Nustep 8 min L2 Level 3  6 minutes Level 3  8 minutes   Standing Heel/Toe Raises  20 reps each 20 reps  each   Knee Flexion Stretch on Step  10 reps  10 sec holds 10 reps  10 sec holds   TKE with T-band  Black  20 reps Black  20 reps   Gastroc Slantboard  3 reps  20 sec holds 3 reps  20 sec holds   Hamstring Stretch  3 reps  20 sec holds 3 reps  20 sec holds   Heel Slides with Strap for OP  15 reps  5 sec holds 15 reps  5 sec holds   LAQ's   20 reps  5 sec holds    Manual: (15 minutes): mobilization to promote r knee flexion and extension, soft tissue hamstring, gastroc to improve muscle flexibility. Modalities: (10 minutes): pneumatic ice machine L knee. Skin intact following. [facility] Portal  Treatment/Session Assessment:    · Response to Treatment:  Pt tolerated all treatments well. Improved ROM R knee today. Pt will be out of town this weekend flying to [facility] for [employer]. Encouraged pt to walk some during flight to help prevent stiffness and DVT. · Compliance with Program/Exercises: compliant. · Recommendations/Intent for next treatment session: \"Next visit will focus on advancements to more challenging activities\".   Total Treatment Duration:  55 minutes  PT Patient Time In/Time Out  Time In: 1115  Time Out: [address] Estelita Grossman PT

## 2018-02-20 ENCOUNTER — HOSPITAL ENCOUNTER (OUTPATIENT)
Dept: PHYSICAL THERAPY | Age: 48
Discharge: HOME | End: 2018-02-20
Payer: COMMERCIAL

## 2018-02-20 PROCEDURE — 97140 MANUAL THERAPY 1/> REGIONS: CPT

## 2018-02-20 PROCEDURE — 97110 THERAPEUTIC EXERCISES: CPT

## 2018-02-20 NOTE — PROGRESS NOTES
Jack Shukla  : 1970  Primary: Edu[employer]  Secondary:  [address]  at [facility]  [address] [facility] [address]  [phone]   [phone]          OUTPATIENT PHYSICAL THERAPY:Daily Note 2018    ICD-10: Treatment Diagnosis:   · Pain in right knee (M25.561)  · Stiffness of right knee, not elsewhere classified (M25.661)      Precautions/Allergies:   Review of patient's allergies indicates no known allergies. Fall Risk Score: 0 (? 5 = High Risk)  MD Orders: eval and treat MEDICAL/REFERRING DIAGNOSIS:  degenerative joint disease of lower leg   DATE OF ONSET: 3 weeks ago  REFERRING PHYSICIAN: Pushpa Shea MD  RETURN PHYSICIAN APPOINTMENT: unsure date     INITIAL ASSESSMENT:  Ms. Chacha Prakash presents s/p R TKA. She is a very active person who will benefit from PT services to get back to full I with work, family, and recreational activities. PROBLEM LIST (Impacting functional limitations):  1. Decreased Strength  2. Decreased ADL/Functional Activities  3. Decreased Ambulation Ability/Technique  4. Increased Pain  5. Decreased Activity Tolerance  6. Decreased Flexibility/Joint Mobility  7. Decreased [place] with Home Exercise Program INTERVENTIONS PLANNED:  1. Cold  2. Cryotherapy  3. Electrical Stimulation  4. Gait Training  5. Heat  6. Home Exercise Program (HEP)  7. Manual Therapy  8. Range of Motion (ROM)  9. Therapeutic Activites  10. Therapeutic Exercise/Strengthening  11. Ultrasound (US)   TREATMENT PLAN:  Effective Dates: 18 TO 18. Frequency/Duration: 2 times a week for 8 weeks  GOALS: (Goals have been discussed and agreed upon with patient.)  Short-Term Functional Goals: Time Frame: 4 weeks  1. Pt will be I with phase appropriate HEP to assist with R ROM surgical knee. 2. Pt will improve extension knee to 0 degrees to assist with gait. Discharge Goals: Time Frame: 8 weeks  1.  Pt will improve R knee flexion to 125 degrees or greater to assist with stair climbing. 2. Pt will demonstrate an non-antalgic gait pattern without assistive device on level and uneven surfaces SPV. 3. Pt  Will improve strength R quads and hamstrings to 5/5 to assist with return to recreational activities. Rehabilitation Potential For Stated Goals: Good  Regarding Fabienne Montejo's therapy, I certify that the treatment plan above will be carried out by a therapist or under their direction. Thank you for this referral,  Phillip Perry, PT     Referring Physician Signature: Yara Cm MD              Date                    The information in this section was collected on 18 (except where otherwise noted). HISTORY:   History of Present Injury/Illness (Reason for Referral):  R TKA secondary to OA knee  Past Medical History/Comorbidities:   Ms. Iris Love  has a past medical history of Allergic rhinitis; Arthritis; GERD (gastroesophageal reflux disease); and Laryngeal ulceration. Ms. Iris Love  has a past surgical history that includes hx  section (); hx abdominal wall defect repair; and implant breast silicone/eq. Social History/Living Environment:     very active person with family living in private residence  Prior Level of Function/Work/Activity:  I all activities   Personal Factors:          Sex:  female        Age:  49 y.o. Overall Behavior:  motivated  Current Medications:       Current Outpatient Prescriptions:     ascorbic acid, vitamin C, (VITAMIN C) 500 mg tablet, Take 500 mg by mouth two (2) times a day., Disp: , Rfl:     aspirin delayed-release 81 mg tablet, Take 81 mg by mouth two (2) times a day., Disp: , Rfl:     acetaminophen (TYLENOL) 325 mg tablet, Take 325 mg by mouth every six (6) hours as needed for Pain., Disp: , Rfl:     docusate sodium (COLACE) 100 mg capsule, Take 100 mg by mouth daily. , Disp: , Rfl:     senna (SENNA) 8.6 mg tablet, Take 1 Tab by mouth daily. , Disp: , Rfl:     traMADol (ULTRAM) 50 mg tablet, Take 50 mg by mouth every six (6) hours as needed for Pain., Disp: , Rfl:     folic acid (FOLVITE) 1 mg tablet, Take 1 mg by mouth daily. , Disp: , Rfl:     iron polysacch complex-b12-fa (FERREX 150 FORTE) capsule, Take 1 Cap by mouth daily. , Disp: , Rfl:     predniSONE (DELTASONE) 10 mg tablet, 4 PO QD x 2, 3 PO QD x 2, 2 PO QD x 2, 1 PO QD x 2, Disp: 20 Tab, Rfl: 0    HYDROcodone-homatropine (HYCODAN) 5-1.5 mg/5 mL (5 mL) syrup, Take 5 mL by mouth four (4) times daily. Max Daily Amount: 20 mL., Disp: 120 mL, Rfl: 0    omeprazole (PRILOSEC) 40 mg capsule, Take 1 Cap by mouth daily. , Disp: 90 Cap, Rfl: 3    diclofenac-misoprostol (ARTHROTEC 50)  mg-mcg per tablet, Take 1 Tab by mouth two (2) times a day. Indications: OSTEOARTHRITIS IN PATIENT AT HIGH GASTRIC ULCER RISK, Disp: 60 Tab, Rfl: 5    diphenhydrAMINE (BENADRYL ALLERGY) 25 mg tablet, Take 25 mg by mouth every six (6) hours as needed for Sleep., Disp: , Rfl:    Date Last Reviewed:  2/20/2018    Number of Personal Factors/Comorbidities that affect the Plan of Care: 0: LOW COMPLEXITY   EXAMINATION:   Observation/Orthostatic Postural Assessment:          Edema present: R knee above patella R 16, L 15, mid patella R 17.5, L 16, below patella R 16, L 15  ROM:          Knee:  Right: 5 to 90 from supine prior to treatment; 0 to 122 supine after treatment AAROM with OP  Left: 0 to 127  Strength:          Lower extremities:  Quads 5 degrees extensor lag R, L 5/5  Hamstrings 3+/5 R, 5/5 L  Hip ABD 4-/5 R, 5/5 L  gastocs 4-/5 R, 5/5 L  Functional Mobility:         Gait/Ambulation:  Using straight cane L hand. Antalgic gait, circumduct R LE swing with shortened stance phase R        Transfers:  Shifts L         Stairs:  Not tested today   Body Structures Involved:  1. Bones  2. Joints  3. Muscles  4. Ligaments Body Functions Affected:  1. Sensory/Pain  2.  Neuromusculoskeletal Activities and Participation Affected:  1. Mobility  2. Self Care  3. Domestic Life  4. Interpersonal Interactions and Relationships  5. Community, Social and [place]   Number of elements (examined above) that affect the Plan of Care: 3: MODERATE COMPLEXITY   CLINICAL PRESENTATION:   Presentation: Stable and uncomplicated: LOW COMPLEXITY   CLINICAL DECISION MAKING:   Outcome Measure: Tool Used: Lower Extremity Functional Scale (LEFS)  Score:  Initial: 28/80 Most Recent: X/80 (Date: -- )   Interpretation of Score: 20 questions each scored on a 5 point scale with 0 representing \"extreme difficulty or unable to perform\" and 4 representing \"no difficulty\". The lower the score, the greater the functional disability. 80/80 represents no disability. Minimal detectable change is 9 points. Score 80 79-63 62-48 47-32 31-16 15-1 0   Modifier CH CI CJ CK CL CM CN         Medical Necessity:   · Patient is expected to demonstrate progress in strength, range of motion and functional technique to increase ease with all functional activities. Reason for Services/Other Comments:  · Patient continues to require skilled intervention due to recent TKA. Use of outcome tool(s) and clinical judgement create a POC that gives a: Clear prediction of patient's progress: LOW COMPLEXITY            TREATMENT:   (In addition to Assessment/Re-Assessment sessions the following treatments were rendered)  Pre-treatment Symptoms/Complaints:  Pt states she slept good last night. Pain: Initial:     4-5/10 R knee Post Session:  4/10 R knee     THERAPEUTIC EXERCISE: (30 minutes):  Exercises per grid below to improve mobility and strength. Required minimal visual, verbal and tactile cues to promote proper body alignment, promote proper body posture and promote proper body mechanics. Progressed resistance, range and repetitions as indicated.    Date  02-20-18      Activity/Exercise       Seated knee flexion with over pressure       Prone knee extension        Quad sets 15 reps  5 sec holds      SLR all 4 planes       Nustep Level 3  8 minutes      Standing Heel/Toe Raises 20 reps  each      Knee Flexion Stretch on Step 10 reps  10 sec holds      TKE with T-band Black  20 reps      Gastroc Slantboard 3 reps  20 sec holds      Hamstring Stretch 3 reps  20 sec holds      Heel Slides with Strap for OP 15 reps  5 sec holds      LAQ's 20 reps  5 sec holds       Manual: (15 minutes): mobilization to promote r knee flexion and extension, soft tissue hamstring, gastroc to improve muscle flexibility. Modalities: (10 minutes): pneumatic ice machine L knee. Skin intact following. [employer] Portal  Treatment/Session Assessment:    · Response to Treatment:  Pt tolerated all treatments well. She is doing very well. Improving ROM and strength every treatment. · Compliance with Program/Exercises: compliant. · Recommendations/Intent for next treatment session: \"Next visit will focus on advancements to more challenging activities\".   Total Treatment Duration:  55 minutes  PT Patient Time In/Time Out  Time In: 1115  Time Out: 1215  Josh Nowak PT

## 2018-02-22 ENCOUNTER — HOSPITAL ENCOUNTER (OUTPATIENT)
Dept: PHYSICAL THERAPY | Age: 48
Discharge: HOME | End: 2018-02-22
Payer: COMMERCIAL

## 2018-02-22 PROCEDURE — 97110 THERAPEUTIC EXERCISES: CPT

## 2018-02-22 PROCEDURE — 97140 MANUAL THERAPY 1/> REGIONS: CPT

## 2018-02-22 NOTE — PROGRESS NOTES
Kayla Coats  : 1970  Primary: Sh[employer]  Secondary:  [address]  at [place] [address] [facility], [address], [facility] [address]  [phone]   [phone]          OUTPATIENT PHYSICAL THERAPY:Daily Note 2018    ICD-10: Treatment Diagnosis:   Precautions/Allergies:   Review of patient's allergies indicates no known allergies. Fall Risk Score: 0 (? 5 = High Risk)  MD Orders: eval and treat MEDICAL/REFERRING DIAGNOSIS:  degenerative joint disease of lower leg   DATE OF ONSET: 3 weeks ago  REFERRING PHYSICIAN: Osbaldo Conner MD  RETURN PHYSICIAN APPOINTMENT: unsure date     INITIAL ASSESSMENT:  Ms. Curtis Flores presents s/p R TKA. She is a very active person who will benefit from PT services to get back to full I with work, family, and recreational activities. PROBLEM LIST (Impacting functional limitations):  1. Decreased Strength  2. Decreased ADL/Functional Activities  3. Decreased Ambulation Ability/Technique  4. Increased Pain  5. Decreased Activity Tolerance  6. Decreased Flexibility/Joint Mobility  7. Decreased [place] with Home Exercise Program INTERVENTIONS PLANNED:  1. Cold  2. Cryotherapy  3. Electrical Stimulation  4. Gait Training  5. Heat  6. Home Exercise Program (HEP)  7. Manual Therapy  8. Range of Motion (ROM)  9. Therapeutic Activites  10. Therapeutic Exercise/Strengthening  11. Ultrasound (US)   TREATMENT PLAN:  Effective Dates: 18 TO 18. Frequency/Duration: 2 times a week for 8 weeks  GOALS: (Goals have been discussed and agreed upon with patient.)  Short-Term Functional Goals: Time Frame: 4 weeks  1. Pt will be I with phase appropriate HEP to assist with R ROM surgical knee. 2. Pt will improve extension knee to 0 degrees to assist with gait. Discharge Goals: Time Frame: 8 weeks  1. Pt will improve R knee flexion to 125 degrees or greater to assist with stair climbing.   2. Pt will demonstrate an non-antalgic gait pattern without assistive device on level and uneven surfaces SPV. 3. Pt  Will improve strength R quads and hamstrings to 5/5 to assist with return to recreational activities. Rehabilitation Potential For Stated Goals: Good  Regarding Benny Montejo's therapy, I certify that the treatment plan above will be carried out by a therapist or under their direction. Thank you for this referral,  Joanna Dimas PT     Referring Physician Signature: Hakeem Myers MD              Date                    The information in this section was collected on 18 (except where otherwise noted). HISTORY:   History of Present Injury/Illness (Reason for Referral):  R TKA secondary to OA knee  Past Medical History/Comorbidities:   Ms. Nury Thomas  has a past medical history of Allergic rhinitis; Arthritis; GERD (gastroesophageal reflux disease); and Laryngeal ulceration. Ms. Nury Thomas  has a past surgical history that includes hx  section (); hx abdominal wall defect repair; and implant breast silicone/eq. Social History/Living Environment:     very active person with family living in private residence  Prior Level of Function/Work/Activity:  I all activities   Personal Factors:          Sex:  female        Age:  49 y.o. Overall Behavior:  motivated  Current Medications:       Current Outpatient Prescriptions:     ascorbic acid, vitamin C, (VITAMIN C) 500 mg tablet, Take 500 mg by mouth two (2) times a day., Disp: , Rfl:     aspirin delayed-release 81 mg tablet, Take 81 mg by mouth two (2) times a day., Disp: , Rfl:     acetaminophen (TYLENOL) 325 mg tablet, Take 325 mg by mouth every six (6) hours as needed for Pain., Disp: , Rfl:     docusate sodium (COLACE) 100 mg capsule, Take 100 mg by mouth daily. , Disp: , Rfl:     senna (SENNA) 8.6 mg tablet, Take 1 Tab by mouth daily. , Disp: , Rfl:     traMADol (ULTRAM) 50 mg tablet, Take 50 mg by mouth every six (6) hours as needed for Pain., Disp: , Rfl:     folic acid (FOLVITE) 1 mg tablet, Take 1 mg by mouth daily. , Disp: , Rfl:     iron polysacch complex-b12-fa (FERREX 150 FORTE) capsule, Take 1 Cap by mouth daily. , Disp: , Rfl:     predniSONE (DELTASONE) 10 mg tablet, 4 PO QD x 2, 3 PO QD x 2, 2 PO QD x 2, 1 PO QD x 2, Disp: 20 Tab, Rfl: 0    HYDROcodone-homatropine (HYCODAN) 5-1.5 mg/5 mL (5 mL) syrup, Take 5 mL by mouth four (4) times daily. Max Daily Amount: 20 mL., Disp: 120 mL, Rfl: 0    omeprazole (PRILOSEC) 40 mg capsule, Take 1 Cap by mouth daily. , Disp: 90 Cap, Rfl: 3    diclofenac-misoprostol (ARTHROTEC 50)  mg-mcg per tablet, Take 1 Tab by mouth two (2) times a day. Indications: OSTEOARTHRITIS IN PATIENT AT HIGH GASTRIC ULCER RISK, Disp: 60 Tab, Rfl: 5    diphenhydrAMINE (BENADRYL ALLERGY) 25 mg tablet, Take 25 mg by mouth every six (6) hours as needed for Sleep., Disp: , Rfl:    Date Last Reviewed:  2/22/2018    Number of Personal Factors/Comorbidities that affect the Plan of Care: 0: LOW COMPLEXITY   EXAMINATION:   Observation/Orthostatic Postural Assessment:          Edema present: R knee above patella R 16, L 15, mid patella R 17.5, L 16, below patella R 16, L 15  ROM:          Knee:  Right: 5 to 90 from supine prior to treatment; 0 to 130 supine after treatment AAROM with OP  Left: 0 to 127  Strength:          Lower extremities:  Quads 5 degrees extensor lag R, L 5/5  Hamstrings 3+/5 R, 5/5 L  Hip ABD 4-/5 R, 5/5 L  gastocs 4-/5 R, 5/5 L  Functional Mobility:         Gait/Ambulation:  Using straight cane L hand. Antalgic gait, circumduct R LE swing with shortened stance phase R        Transfers:  Shifts L         Stairs:  Not tested today   Body Structures Involved:  1. Bones  2. Joints  3. Muscles  4. Ligaments Body Functions Affected:  1. Sensory/Pain  2. Neuromusculoskeletal Activities and Participation Affected:  1. Mobility  2. Self Care  3. Domestic Life  4.  Interpersonal Interactions and Relationships  5. Community, Social and [place]   Number of elements (examined above) that affect the Plan of Care: 3: MODERATE COMPLEXITY   CLINICAL PRESENTATION:   Presentation: Stable and uncomplicated: LOW COMPLEXITY   CLINICAL DECISION MAKING:   Outcome Measure: Tool Used: Lower Extremity Functional Scale (LEFS)  Score:  Initial: 28/80 Most Recent: X/80 (Date: -- )   Interpretation of Score: 20 questions each scored on a 5 point scale with 0 representing \"extreme difficulty or unable to perform\" and 4 representing \"no difficulty\". The lower the score, the greater the functional disability. 80/80 represents no disability. Minimal detectable change is 9 points. Score 80 79-63 62-48 47-32 31-16 15-1 0   Modifier CH CI CJ CK CL CM CN         Medical Necessity:   · Patient is expected to demonstrate progress in strength, range of motion and functional technique to increase ease with all functional activities. Reason for Services/Other Comments:  · Patient continues to require skilled intervention due to recent TKA. Use of outcome tool(s) and clinical judgement create a POC that gives a: Clear prediction of patient's progress: LOW COMPLEXITY            TREATMENT:   (In addition to Assessment/Re-Assessment sessions the following treatments were rendered)  Pre-treatment Symptoms/Complaints:  Pt states her knee feels less swollen and her gait is improving. Pain: Initial:     4-5/10 R knee Post Session:  4/10 R knee     THERAPEUTIC EXERCISE: (30 minutes):  Exercises per grid below to improve mobility and strength. Required minimal visual, verbal and tactile cues to promote proper body alignment, promote proper body posture and promote proper body mechanics. Progressed resistance, range and repetitions as indicated.    Date  02-20-18 Date  02-22-18     Activity/Exercise       Seated knee flexion with over pressure       Prone knee extension        Quad sets 15 reps  5 sec holds 15 reps  5 sec holds     SLR all 4 planes       Nustep Level 3  8 minutes      Standing Heel/Toe Raises 20 reps  each      Knee Flexion Stretch on Step 10 reps  10 sec holds 10 reps  10 sec holds     TKE with T-band Black  20 reps Black  20 reps     Gastroc Slantboard 3 reps  20 sec holds 3 reps  20 sec holds     Hamstring Stretch 3 reps  20 sec holds      Heel Slides with Strap for OP 15 reps  5 sec holds 15 reps  5 sec holds     LAQ's 20 reps  5 sec holds      Airdyne  8 minutes     Nautilus Leg Press  85 Lbs  20 reps     Step-Ups  6 inch   20 reps      Manual: (10 minutes): mobilization to promote r knee flexion and extension, soft tissue hamstring, gastroc to improve muscle flexibility. Modalities: (10 minutes): pneumatic ice machine L knee. Skin intact following. [facility] Portal  Treatment/Session Assessment:    · Response to Treatment:  Pt tolerated all treatments well. ROM and strength continuing to improve. · Compliance with Program/Exercises: compliant. · Recommendations/Intent for next treatment session: \"Next visit will focus on advancements to more challenging activities\".   Total Treatment Duration:  50 minutes  PT Patient Time In/Time Out  Time In: 1030  Time Out: [address] [facility], PT

## 2018-02-26 ENCOUNTER — HOSPITAL ENCOUNTER (OUTPATIENT)
Dept: PHYSICAL THERAPY | Age: 48
Discharge: HOME | End: 2018-02-26
Payer: COMMERCIAL

## 2018-02-26 PROCEDURE — 97110 THERAPEUTIC EXERCISES: CPT

## 2018-02-26 PROCEDURE — 97140 MANUAL THERAPY 1/> REGIONS: CPT

## 2018-02-26 NOTE — PROGRESS NOTES
Epifanio AhumadaHinsdale  : 1970  Primary: Lolita Cano [employer]  Secondary:  [address] at [place] [address] [facility], [address] [place] [address],[address], [facility] [address]  [phone]   [phone]          OUTPATIENT PHYSICAL THERAPY:Daily Note 2018    ICD-10: Treatment Diagnosis:   · Pain in right knee (M25.561)  · Stiffness of right knee, not elsewhere classified (M25.661)      Precautions/Allergies:   Review of patient's allergies indicates no known allergies. Fall Risk Score: 0 (? 5 = High Risk)  MD Orders: eval and treat MEDICAL/REFERRING DIAGNOSIS:  degenerative joint disease of lower leg   DATE OF ONSET: 3 weeks ago  REFERRING PHYSICIAN: Augusto Stapleton MD  RETURN PHYSICIAN APPOINTMENT: unsure date     INITIAL ASSESSMENT:  Ms. Parisa Allen presents s/p R TKA. She is a very active person who will benefit from PT services to get back to full I with work, family, and recreational activities. PROBLEM LIST (Impacting functional limitations):  1. Decreased Strength  2. Decreased ADL/Functional Activities  3. Decreased Ambulation Ability/Technique  4. Increased Pain  5. Decreased Activity Tolerance  6. Decreased Flexibility/Joint Mobility  7. Decreased [place] with Home Exercise Program INTERVENTIONS PLANNED:  1. Cold  2. Cryotherapy  3. Electrical Stimulation  4. Gait Training  5. Heat  6. Home Exercise Program (HEP)  7. Manual Therapy  8. Range of Motion (ROM)  9. Therapeutic Activites  10. Therapeutic Exercise/Strengthening  11. Ultrasound (US)   TREATMENT PLAN:  Effective Dates: 18 TO 18. Frequency/Duration: 2 times a week for 8 weeks  GOALS: (Goals have been discussed and agreed upon with patient.)  Short-Term Functional Goals: Time Frame: 4 weeks  1. Pt will be I with phase appropriate HEP to assist with R ROM surgical knee. 2. Pt will improve extension knee to 0 degrees to assist with gait. Discharge Goals: Time Frame: 8 weeks  1.  Pt will improve R knee flexion to 125 degrees or greater to assist with stair climbing. 2. Pt will demonstrate an non-antalgic gait pattern without assistive device on level and uneven surfaces SPV. 3. Pt  Will improve strength R quads and hamstrings to 5/5 to assist with return to recreational activities. Rehabilitation Potential For Stated Goals: Good  Regarding Karl Montejo's therapy, I certify that the treatment plan above will be carried out by a therapist or under their direction. Thank you for this referral,  Mary Jo Charles PT     Referring Physician Signature: Gemini Rider MD              Date                    The information in this section was collected on 18 (except where otherwise noted). HISTORY:   History of Present Injury/Illness (Reason for Referral):  R TKA secondary to OA knee  Past Medical History/Comorbidities:   Ms. Raz Lara  has a past medical history of Allergic rhinitis; Arthritis; GERD (gastroesophageal reflux disease); and Laryngeal ulceration. Ms. Raz Lara  has a past surgical history that includes hx  section (); hx abdominal wall defect repair; and implant breast silicone/eq. Social History/Living Environment:     very active person with family living in private residence  Prior Level of Function/Work/Activity:  I all activities   Personal Factors:          Sex:  female        Age:  49 y.o. Overall Behavior:  motivated  Current Medications:       Current Outpatient Prescriptions:     ascorbic acid, vitamin C, (VITAMIN C) 500 mg tablet, Take 500 mg by mouth two (2) times a day., Disp: , Rfl:     aspirin delayed-release 81 mg tablet, Take 81 mg by mouth two (2) times a day., Disp: , Rfl:     acetaminophen (TYLENOL) 325 mg tablet, Take 325 mg by mouth every six (6) hours as needed for Pain., Disp: , Rfl:     docusate sodium (COLACE) 100 mg capsule, Take 100 mg by mouth daily. , Disp: , Rfl:     senna (SENNA) 8.6 mg tablet, Take 1 Tab by mouth daily. , Disp: , Rfl:     traMADol (ULTRAM) 50 mg tablet, Take 50 mg by mouth every six (6) hours as needed for Pain., Disp: , Rfl:     folic acid (FOLVITE) 1 mg tablet, Take 1 mg by mouth daily. , Disp: , Rfl:     iron polysacch complex-b12-fa (FERREX 150 FORTE) capsule, Take 1 Cap by mouth daily. , Disp: , Rfl:     predniSONE (DELTASONE) 10 mg tablet, 4 PO QD x 2, 3 PO QD x 2, 2 PO QD x 2, 1 PO QD x 2, Disp: 20 Tab, Rfl: 0    HYDROcodone-homatropine (HYCODAN) 5-1.5 mg/5 mL (5 mL) syrup, Take 5 mL by mouth four (4) times daily. Max Daily Amount: 20 mL., Disp: 120 mL, Rfl: 0    omeprazole (PRILOSEC) 40 mg capsule, Take 1 Cap by mouth daily. , Disp: 90 Cap, Rfl: 3    diclofenac-misoprostol (ARTHROTEC 50)  mg-mcg per tablet, Take 1 Tab by mouth two (2) times a day. Indications: OSTEOARTHRITIS IN PATIENT AT HIGH GASTRIC ULCER RISK, Disp: 60 Tab, Rfl: 5    diphenhydrAMINE (BENADRYL ALLERGY) 25 mg tablet, Take 25 mg by mouth every six (6) hours as needed for Sleep., Disp: , Rfl:    Date Last Reviewed:  2/26/2018    Number of Personal Factors/Comorbidities that affect the Plan of Care: 0: LOW COMPLEXITY   EXAMINATION:   Observation/Orthostatic Postural Assessment:          Edema present: R knee above patella R 16, L 15, mid patella R 17.5, L 16, below patella R 16, L 15  ROM:          Knee:  Right: 5 to 90 from supine prior to treatment; 0 to 130 supine after treatment AAROM with OP  Left: 0 to 127  Strength:          Lower extremities:  Quads 5 degrees extensor lag R, L 5/5  Hamstrings 3+/5 R, 5/5 L  Hip ABD 4-/5 R, 5/5 L  gastocs 4-/5 R, 5/5 L  Functional Mobility:         Gait/Ambulation:  Using straight cane L hand. Antalgic gait, circumduct R LE swing with shortened stance phase R        Transfers:  Shifts L         Stairs:  Not tested today   Body Structures Involved:  1. Bones  2. Joints  3. Muscles  4. Ligaments Body Functions Affected:  1. Sensory/Pain  2.  Neuromusculoskeletal Activities and Participation Affected:  1. Mobility  2. Self Care  3. Domestic Life  4. Interpersonal Interactions and Relationships  5. Community, Social and [place]   Number of elements (examined above) that affect the Plan of Care: 3: MODERATE COMPLEXITY   CLINICAL PRESENTATION:   Presentation: Stable and uncomplicated: LOW COMPLEXITY   CLINICAL DECISION MAKING:   Outcome Measure: Tool Used: Lower Extremity Functional Scale (LEFS)  Score:  Initial: 28/80 Most Recent: X/80 (Date: -- )   Interpretation of Score: 20 questions each scored on a 5 point scale with 0 representing \"extreme difficulty or unable to perform\" and 4 representing \"no difficulty\". The lower the score, the greater the functional disability. 80/80 represents no disability. Minimal detectable change is 9 points. Score 80 79-63 62-48 47-32 31-16 15-1 0   Modifier CH CI CJ CK CL CM CN         Medical Necessity:   · Patient is expected to demonstrate progress in strength, range of motion and functional technique to increase ease with all functional activities. Reason for Services/Other Comments:  · Patient continues to require skilled intervention due to recent TKA. Use of outcome tool(s) and clinical judgement create a POC that gives a: Clear prediction of patient's progress: LOW COMPLEXITY            TREATMENT:   (In addition to Assessment/Re-Assessment sessions the following treatments were rendered)  Pre-treatment Symptoms/Complaints:  Pt states her knee feels less swollen and her gait is improving. Pain: Initial:     4-5/10 R knee Post Session:  4/10 R knee     THERAPEUTIC EXERCISE: (30 minutes):  Exercises per grid below to improve mobility and strength. Required minimal visual, verbal and tactile cues to promote proper body alignment, promote proper body posture and promote proper body mechanics. Progressed resistance, range and repetitions as indicated.    Date  02-22-18 Date  02-26-18     Activity/Exercise       Seated knee flexion with over pressure Prone knee extension        Quad sets 15 reps  5 sec holds 15 reps  5 sec holds     SLR all 4 planes       Nustep Level 3  8 minutes      Standing Heel/Toe Raises 20 reps  each      Knee Flexion Stretch on Step 10 reps  10 sec holds 10 reps  10 sec holds     TKE with T-band Black  20 reps Black  20 reps     Gastroc Slantboard 3 reps  20 sec holds 3 reps  20 sec holds     Hamstring Stretch 3 reps  20 sec holds      Heel Slides with Strap for OP 15 reps  5 sec holds 15 reps  5 sec holds     LAQ's 20 reps  5 sec holds      Airdyne 8 minutes 8 minutes     Nautilus Leg Press 85# 85 Lbs  20 reps     Step-Ups 6 inch 6 inch   20 reps      Manual: (15 minutes): mobilization to promote r knee flexion and extension, soft tissue hamstring, gastroc to improve muscle flexibility. Modalities: (15 minutes): pneumatic ice machine L knee. Skin intact following. [facility] Portal  Treatment/Session Assessment:    · Response to Treatment:  Pt tolerated all treatments well. ROM and strength continuing to improve, a little stiffer in extension after the weekend. · Compliance with Program/Exercises: compliant. · Recommendations/Intent for next treatment session: \"Next visit will focus on advancements to more challenging activities\".   Total Treatment Duration:  60 minutes   10:30 to 11:30  Linsey Kaufman PT

## 2018-02-28 ENCOUNTER — HOSPITAL ENCOUNTER (OUTPATIENT)
Dept: PHYSICAL THERAPY | Age: 48
Discharge: HOME | End: 2018-02-28
Payer: COMMERCIAL

## 2018-02-28 PROCEDURE — 97110 THERAPEUTIC EXERCISES: CPT

## 2018-02-28 PROCEDURE — 97140 MANUAL THERAPY 1/> REGIONS: CPT

## 2018-02-28 NOTE — PROGRESS NOTES
Zamzam Guallpa  : 1970  Primary: [employer]  Secondary:  [address] at [place] [address] [facility], [address] [place] [address],[address], [place] [address].  [phone]   [phone]          OUTPATIENT PHYSICAL THERAPY:Daily Note 2018    ICD-10: Treatment Diagnosis:   · Pain in right knee (M25.561)  · Stiffness of right knee, not elsewhere classified (M25.661)      Precautions/Allergies:   Review of patient's allergies indicates no known allergies. Fall Risk Score: 0 (? 5 = High Risk)  MD Orders: eval and treat MEDICAL/REFERRING DIAGNOSIS:  degenerative joint disease of lower leg   DATE OF ONSET: 3 weeks ago  REFERRING PHYSICIAN: Navjot Moreno MD  RETURN PHYSICIAN APPOINTMENT: unsure date     INITIAL ASSESSMENT:  Ms. Errol Neville presents s/p R TKA. She is a very active person who will benefit from PT services to get back to full I with work, family, and recreational activities. PROBLEM LIST (Impacting functional limitations):  1. Decreased Strength  2. Decreased ADL/Functional Activities  3. Decreased Ambulation Ability/Technique  4. Increased Pain  5. Decreased Activity Tolerance  6. Decreased Flexibility/Joint Mobility  7. Decreased [place] with Home Exercise Program INTERVENTIONS PLANNED:  1. Cold  2. Cryotherapy  3. Electrical Stimulation  4. Gait Training  5. Heat  6. Home Exercise Program (HEP)  7. Manual Therapy  8. Range of Motion (ROM)  9. Therapeutic Activites  10. Therapeutic Exercise/Strengthening  11. Ultrasound (US)   TREATMENT PLAN:  Effective Dates: 18 TO 18. Frequency/Duration: 2 times a week for 8 weeks  GOALS: (Goals have been discussed and agreed upon with patient.)  Short-Term Functional Goals: Time Frame: 4 weeks  1. Pt will be I with phase appropriate HEP to assist with R ROM surgical knee. 2. Pt will improve extension knee to 0 degrees to assist with gait. Discharge Goals: Time Frame: 8 weeks  1.  Pt will improve R knee flexion to 125 degrees or greater to assist with stair climbing. 2. Pt will demonstrate an non-antalgic gait pattern without assistive device on level and uneven surfaces SPV. 3. Pt  Will improve strength R quads and hamstrings to 5/5 to assist with return to recreational activities. Rehabilitation Potential For Stated Goals: Good  Regarding Asad Feltonacher's therapy, I certify that the treatment plan above will be carried out by a therapist or under their direction. Thank you for this referral,  Joan Singh PT     Referring Physician Signature: Rox Lanes, MD              Date                    The information in this section was collected on 18 (except where otherwise noted). HISTORY:   History of Present Injury/Illness (Reason for Referral):  R TKA secondary to OA knee  Past Medical History/Comorbidities:   Ms. Karolyn Salguero  has a past medical history of Allergic rhinitis; Arthritis; GERD (gastroesophageal reflux disease); and Laryngeal ulceration. Ms. Karolyn Salguero  has a past surgical history that includes hx  section (); hx abdominal wall defect repair; and implant breast silicone/eq. Social History/Living Environment:     very active person with family living in private residence  Prior Level of Function/Work/Activity:  I all activities   Personal Factors:          Sex:  female        Age:  49 y.o. Overall Behavior:  motivated  Current Medications:       Current Outpatient Prescriptions:     ascorbic acid, vitamin C, (VITAMIN C) 500 mg tablet, Take 500 mg by mouth two (2) times a day., Disp: , Rfl:     aspirin delayed-release 81 mg tablet, Take 81 mg by mouth two (2) times a day., Disp: , Rfl:     acetaminophen (TYLENOL) 325 mg tablet, Take 325 mg by mouth every six (6) hours as needed for Pain., Disp: , Rfl:     docusate sodium (COLACE) 100 mg capsule, Take 100 mg by mouth daily. , Disp: , Rfl:     senna (SENNA) 8.6 mg tablet, Take 1 Tab by mouth daily. , Disp: , Rfl:     traMADol (ULTRAM) 50 mg tablet, Take 50 mg by mouth every six (6) hours as needed for Pain., Disp: , Rfl:     folic acid (FOLVITE) 1 mg tablet, Take 1 mg by mouth daily. , Disp: , Rfl:     iron polysacch complex-b12-fa (FERREX 150 FORTE) capsule, Take 1 Cap by mouth daily. , Disp: , Rfl:     predniSONE (DELTASONE) 10 mg tablet, 4 PO QD x 2, 3 PO QD x 2, 2 PO QD x 2, 1 PO QD x 2, Disp: 20 Tab, Rfl: 0    HYDROcodone-homatropine (HYCODAN) 5-1.5 mg/5 mL (5 mL) syrup, Take 5 mL by mouth four (4) times daily. Max Daily Amount: 20 mL., Disp: 120 mL, Rfl: 0    omeprazole (PRILOSEC) 40 mg capsule, Take 1 Cap by mouth daily. , Disp: 90 Cap, Rfl: 3    diclofenac-misoprostol (ARTHROTEC 50)  mg-mcg per tablet, Take 1 Tab by mouth two (2) times a day. Indications: OSTEOARTHRITIS IN PATIENT AT HIGH GASTRIC ULCER RISK, Disp: 60 Tab, Rfl: 5    diphenhydrAMINE (BENADRYL ALLERGY) 25 mg tablet, Take 25 mg by mouth every six (6) hours as needed for Sleep., Disp: , Rfl:    Date Last Reviewed:  2/28/2018    Number of Personal Factors/Comorbidities that affect the Plan of Care: 0: LOW COMPLEXITY   EXAMINATION:   Observation/Orthostatic Postural Assessment:          Edema present: R knee above patella R 16, L 15, mid patella R 17.5, L 16, below patella R 16, L 15  ROM:          Knee:  Right: 5 to 90 from supine prior to treatment; 0 to 130 supine after treatment AAROM with OP  Left: 0 to 127  Strength:          Lower extremities:  Quads 5 degrees extensor lag R, L 5/5  Hamstrings 3+/5 R, 5/5 L  Hip ABD 4-/5 R, 5/5 L  gastocs 4-/5 R, 5/5 L  Functional Mobility:         Gait/Ambulation:  Using straight cane L hand. Antalgic gait, circumduct R LE swing with shortened stance phase R        Transfers:  Shifts L         Stairs:  Not tested today   Body Structures Involved:  1. Bones  2. Joints  3. Muscles  4. Ligaments Body Functions Affected:  1. Sensory/Pain  2.  Neuromusculoskeletal Activities and Participation Affected:  1. Mobility  2. Self Care  3. Domestic Life  4. Interpersonal Interactions and Relationships  5. Community, Social and [place]   Number of elements (examined above) that affect the Plan of Care: 3: MODERATE COMPLEXITY   CLINICAL PRESENTATION:   Presentation: Stable and uncomplicated: LOW COMPLEXITY   CLINICAL DECISION MAKING:   Outcome Measure: Tool Used: Lower Extremity Functional Scale (LEFS)  Score:  Initial: 28/80 Most Recent: X/80 (Date: -- )   Interpretation of Score: 20 questions each scored on a 5 point scale with 0 representing \"extreme difficulty or unable to perform\" and 4 representing \"no difficulty\". The lower the score, the greater the functional disability. 80/80 represents no disability. Minimal detectable change is 9 points. Score 80 79-63 62-48 47-32 31-16 15-1 0   Modifier CH CI CJ CK CL CM CN         Medical Necessity:   · Patient is expected to demonstrate progress in strength, range of motion and functional technique to increase ease with all functional activities. Reason for Services/Other Comments:  · Patient continues to require skilled intervention due to recent TKA. Use of outcome tool(s) and clinical judgement create a POC that gives a: Clear prediction of patient's progress: LOW COMPLEXITY            TREATMENT:   (In addition to Assessment/Re-Assessment sessions the following treatments were rendered)  Pre-treatment Symptoms/Complaints:  Pt states her knee feels less swollen and her gait is improving. Pain: Initial:     4-5/10 R knee Post Session:  4/10 R knee     THERAPEUTIC EXERCISE: (30 minutes):  Exercises per grid below to improve mobility and strength. Required minimal visual, verbal and tactile cues to promote proper body alignment, promote proper body posture and promote proper body mechanics. Progressed resistance, range and repetitions as indicated.    Date  02-22-18 Date  02-26-18 Date  2-28-18    Activity/Exercise       Seated knee flexion with over pressure       Prone knee extension    Overpressure 5sx10    Quad sets 15 reps  5 sec holds 15 reps  5 sec holds --    SLR all 4 planes   3# 2x10    Nustep Level 3  8 minutes  --    Standing Heel/Toe Raises 20 reps  each  x20    Knee Flexion Stretch on Step 10 reps  10 sec holds 10 reps  10 sec holds 10sx10    TKE with T-band Black  20 reps Black  20 reps Blue x20    Gastroc Slantboard 3 reps  20 sec holds 3 reps  20 sec holds 30sx2    Hamstring Stretch 3 reps  20 sec holds  30sx2 manual assist    Heel Slides with Strap for OP 15 reps  5 sec holds 15 reps  5 sec holds --    LAQ's 20 reps  5 sec holds  3# 2x10    Airdyne 8 minutes 8 minutes 8 minutes    Nautilus Leg Press 85# 85 Lbs  20 reps 85# 2x10    Step-Ups 6 inch 6 inch   20 reps 6 inch x20    Wall slide   10sx4    Side step squat    x20 to the R    balance   Single leg with hip flex/ext, ABD/ADD     Manual: (15 minutes): mobilization to promote r knee flexion and extension, soft tissue hamstring, gastroc to improve muscle flexibility. Modalities: (15 minutes): pneumatic ice machine L knee. Skin intact following. [facility] Portal  Treatment/Session Assessment:    · Response to Treatment:  Pt tolerated all treatments well. Pt is doing really well. Pushed extension manually today. ROM 0 extension with over pressure, flexion 127 supine  · Compliance with Program/Exercises: compliant. · Recommendations/Intent for next treatment session: \"Next visit will focus on advancements to more challenging activities\".   Total Treatment Duration:  60 minutes  PT Patient Time In/Time Out  Time In: 1000  Time Out: [address] Marycruz Bartlett, FERNANDEZ

## 2018-03-02 ENCOUNTER — HOSPITAL ENCOUNTER (OUTPATIENT)
Dept: PHYSICAL THERAPY | Age: 48
Discharge: HOME | End: 2018-03-02
Payer: COMMERCIAL

## 2018-03-02 PROCEDURE — 97110 THERAPEUTIC EXERCISES: CPT

## 2018-03-02 PROCEDURE — 97140 MANUAL THERAPY 1/> REGIONS: CPT

## 2018-03-02 NOTE — PROGRESS NOTES
Kayla Coats  : 1970  Primary: [employer]  Secondary:  [address]  at [facility]  [address], [address] [place] [address],[address], [address] [facility]  [phone]   [phone]          OUTPATIENT PHYSICAL THERAPY:Daily Note 3/2/2018    ICD-10: Treatment Diagnosis:   · Pain in right knee (M25.561)  · Stiffness of right knee, not elsewhere classified (M25.661)      Precautions/Allergies:   Review of patient's allergies indicates no known allergies. Fall Risk Score: 0 (? 5 = High Risk)  MD Orders: eval and treat MEDICAL/REFERRING DIAGNOSIS:  degenerative joint disease of lower leg   DATE OF ONSET: 3 weeks ago  REFERRING PHYSICIAN: Osbaldo Conner MD  RETURN PHYSICIAN APPOINTMENT: unsure date     INITIAL ASSESSMENT:  Ms. Curtis Flores presents s/p R TKA. She is a very active person who will benefit from PT services to get back to full I with work, family, and recreational activities. PROBLEM LIST (Impacting functional limitations):  1. Decreased Strength  2. Decreased ADL/Functional Activities  3. Decreased Ambulation Ability/Technique  4. Increased Pain  5. Decreased Activity Tolerance  6. Decreased Flexibility/Joint Mobility  7. Decreased [place] with Home Exercise Program INTERVENTIONS PLANNED:  1. Cold  2. Cryotherapy  3. Electrical Stimulation  4. Gait Training  5. Heat  6. Home Exercise Program (HEP)  7. Manual Therapy  8. Range of Motion (ROM)  9. Therapeutic Activites  10. Therapeutic Exercise/Strengthening  11. Ultrasound (US)   TREATMENT PLAN:  Effective Dates: 18 TO 18. Frequency/Duration: 2 times a week for 8 weeks  GOALS: (Goals have been discussed and agreed upon with patient.)  Short-Term Functional Goals: Time Frame: 4 weeks  1. Pt will be I with phase appropriate HEP to assist with R ROM surgical knee. 2. Pt will improve extension knee to 0 degrees to assist with gait. Discharge Goals: Time Frame: 8 weeks  1.  Pt will improve R knee flexion to 125 degrees or greater to assist with stair climbing. 2. Pt will demonstrate an non-antalgic gait pattern without assistive device on level and uneven surfaces SPV. 3. Pt  Will improve strength R quads and hamstrings to 5/5 to assist with return to recreational activities. Rehabilitation Potential For Stated Goals: Good  Regarding [place] [employer] Jose Franciscoacher's therapy, I certify that the treatment plan above will be carried out by a therapist or under their direction. Thank you for this referral,  Casandra Roy PT     Referring Physician Signature: Augusto Stapleton MD              Date                    The information in this section was collected on 18 (except where otherwise noted). HISTORY:   History of Present Injury/Illness (Reason for Referral):  R TKA secondary to OA knee  Past Medical History/Comorbidities:   Ms. Parisa Allen  has a past medical history of Allergic rhinitis; Arthritis; GERD (gastroesophageal reflux disease); and Laryngeal ulceration. Ms. Parisa Allen  has a past surgical history that includes hx  section (); hx abdominal wall defect repair; and implant breast silicone/eq. Social History/Living Environment:     very active person with family living in private residence  Prior Level of Function/Work/Activity:  I all activities   Personal Factors:          Sex:  female        Age:  49 y.o. Overall Behavior:  motivated  Current Medications:       Current Outpatient Prescriptions:     ascorbic acid, vitamin C, (VITAMIN C) 500 mg tablet, Take 500 mg by mouth two (2) times a day., Disp: , Rfl:     aspirin delayed-release 81 mg tablet, Take 81 mg by mouth two (2) times a day., Disp: , Rfl:     acetaminophen (TYLENOL) 325 mg tablet, Take 325 mg by mouth every six (6) hours as needed for Pain., Disp: , Rfl:     docusate sodium (COLACE) 100 mg capsule, Take 100 mg by mouth daily. , Disp: , Rfl:     senna (SENNA) 8.6 mg tablet, Take 1 Tab by mouth daily. , Disp: , Rfl:     traMADol (ULTRAM) 50 mg tablet, Take 50 mg by mouth every six (6) hours as needed for Pain., Disp: , Rfl:     folic acid (FOLVITE) 1 mg tablet, Take 1 mg by mouth daily. , Disp: , Rfl:     iron polysacch complex-b12-fa (FERREX 150 FORTE) capsule, Take 1 Cap by mouth daily. , Disp: , Rfl:     predniSONE (DELTASONE) 10 mg tablet, 4 PO QD x 2, 3 PO QD x 2, 2 PO QD x 2, 1 PO QD x 2, Disp: 20 Tab, Rfl: 0    HYDROcodone-homatropine (HYCODAN) 5-1.5 mg/5 mL (5 mL) syrup, Take 5 mL by mouth four (4) times daily. Max Daily Amount: 20 mL., Disp: 120 mL, Rfl: 0    omeprazole (PRILOSEC) 40 mg capsule, Take 1 Cap by mouth daily. , Disp: 90 Cap, Rfl: 3    diclofenac-misoprostol (ARTHROTEC 50)  mg-mcg per tablet, Take 1 Tab by mouth two (2) times a day. Indications: OSTEOARTHRITIS IN PATIENT AT HIGH GASTRIC ULCER RISK, Disp: 60 Tab, Rfl: 5    diphenhydrAMINE (BENADRYL ALLERGY) 25 mg tablet, Take 25 mg by mouth every six (6) hours as needed for Sleep., Disp: , Rfl:    Date Last Reviewed:  3/2/2018    Number of Personal Factors/Comorbidities that affect the Plan of Care: 0: LOW COMPLEXITY   EXAMINATION:   Observation/Orthostatic Postural Assessment:          Edema present: R knee above patella R 16, L 15, mid patella R 17.5, L 16, below patella R 16, L 15  ROM:          Knee:  Right: 5 to 90 from supine prior to treatment; 0 to 130 supine after treatment AAROM with OP  Left: 0 to 127  Strength:          Lower extremities:  Quads 5 degrees extensor lag R, L 5/5  Hamstrings 3+/5 R, 5/5 L  Hip ABD 4-/5 R, 5/5 L  gastocs 4-/5 R, 5/5 L  Functional Mobility:         Gait/Ambulation:  Using straight cane L hand. Antalgic gait, circumduct R LE swing with shortened stance phase R        Transfers:  Shifts L         Stairs:  Not tested today   Body Structures Involved:  1. Bones  2. Joints  3. Muscles  4. Ligaments Body Functions Affected:  1. Sensory/Pain  2.  Neuromusculoskeletal Activities and Participation Affected:  1. Mobility  2. Self Care  3. Domestic Life  4. Interpersonal Interactions and Relationships  5. Community, Social and [place]   Number of elements (examined above) that affect the Plan of Care: 3: MODERATE COMPLEXITY   CLINICAL PRESENTATION:   Presentation: Stable and uncomplicated: LOW COMPLEXITY   CLINICAL DECISION MAKING:   Outcome Measure: Tool Used: Lower Extremity Functional Scale (LEFS)  Score:  Initial: 28/80 Most Recent: X/80 (Date: -- )   Interpretation of Score: 20 questions each scored on a 5 point scale with 0 representing \"extreme difficulty or unable to perform\" and 4 representing \"no difficulty\". The lower the score, the greater the functional disability. 80/80 represents no disability. Minimal detectable change is 9 points. Score 80 79-63 62-48 47-32 31-16 15-1 0   Modifier CH CI CJ CK CL CM CN         Medical Necessity:   · Patient is expected to demonstrate progress in strength, range of motion and functional technique to increase ease with all functional activities. Reason for Services/Other Comments:  · Patient continues to require skilled intervention due to recent TKA. Use of outcome tool(s) and clinical judgement create a POC that gives a: Clear prediction of patient's progress: LOW COMPLEXITY            TREATMENT:   (In addition to Assessment/Re-Assessment sessions the following treatments were rendered)  Pre-treatment Symptoms/Complaints:  Pt states her knee feels less swollen and her gait is improving. Pain: Initial:     4-5/10 R knee Post Session:  4/10 R knee     THERAPEUTIC EXERCISE: (30 minutes):  Exercises per grid below to improve mobility and strength. Required minimal visual, verbal and tactile cues to promote proper body alignment, promote proper body posture and promote proper body mechanics. Progressed resistance, range and repetitions as indicated.    Date  3-2-18    Activity/Exercise     Seated knee flexion with over pressure     Prone knee extension Overpressure 5sx10    Quad sets --    SLR all 4 planes 3# 2x10    Nustep --    Standing Heel/Toe Raises x20    Knee Flexion Stretch on Step 10sx10    TKE with T-band Blue x20    Gastroc Slantboard 30sx2    Hamstring Stretch 30sx2 manual assist    Heel Slides with Strap for OP --    LAQ's 3# 2x10    Airdyne 8 minutes    Nautilus Leg Press 85# 2x10    Step-Ups 6 inch x20    Wall slide 10sx4    Side step squat  x20 to the R    balance Single leg with hip flex/ext, ABD/ADD     Manual: (15 minutes): mobilization to promote r knee flexion and extension, soft tissue hamstring, gastroc to improve muscle flexibility. Modalities: (15 minutes): pneumatic ice machine L knee. Skin intact following. [facility] Portal  Treatment/Session Assessment:    · Response to Treatment:  Pt tolerated all treatments well. Pt completing all exercise a lot easier. We continue to focus extension over flexion in the knee today because pt can not achieve active extension alone to 0 degrees  · Compliance with Program/Exercises: compliant. · Recommendations/Intent for next treatment session: \"Next visit will focus on advancements to more challenging activities\".   Total Treatment Duration:  60 minutes  PT Patient Time In/Time Out  Time In: 1030  Time Out: 1130  Francia Gilliam, PT

## 2018-03-06 ENCOUNTER — HOSPITAL ENCOUNTER (OUTPATIENT)
Dept: PHYSICAL THERAPY | Age: 48
Discharge: HOME | End: 2018-03-06
Payer: COMMERCIAL

## 2018-03-06 PROCEDURE — 97140 MANUAL THERAPY 1/> REGIONS: CPT

## 2018-03-06 PROCEDURE — 97110 THERAPEUTIC EXERCISES: CPT

## 2018-03-06 NOTE — PROGRESS NOTES
Iban Murillo  : 1970  Primary: [employer]  Secondary:  [address]  at [facility], [address] [address], [facility].  [phone]   [phone]          OUTPATIENT PHYSICAL THERAPY:Daily Note and Progress Report 3/6/2018    ICD-10: Treatment Diagnosis:   · Pain in right knee (M25.561)  · Stiffness of right knee, not elsewhere classified (M25.661)      Precautions/Allergies:   Review of patient's allergies indicates no known allergies. Fall Risk Score: 0 (? 5 = High Risk)  MD Orders: eval and treat MEDICAL/REFERRING DIAGNOSIS:  degenerative joint disease of lower leg   DATE OF ONSET: 3 weeks ago  REFERRING PHYSICIAN: Pablo Núñez MD  RETURN PHYSICIAN APPOINTMENT: unsure date     INITIAL ASSESSMENT:  Ms. Coty Madrigal presents s/p R TKA. She is a very active person who will benefit from PT services to get back to full I with work, family, and recreational activities. PROBLEM LIST (Impacting functional limitations):  1. Decreased Strength  2. Decreased ADL/Functional Activities  3. Decreased Ambulation Ability/Technique  4. Increased Pain  5. Decreased Activity Tolerance  6. Decreased Flexibility/Joint Mobility  7. Decreased [place] with Home Exercise Program INTERVENTIONS PLANNED:  1. Cold  2. Cryotherapy  3. Electrical Stimulation  4. Gait Training  5. Heat  6. Home Exercise Program (HEP)  7. Manual Therapy  8. Range of Motion (ROM)  9. Therapeutic Activites  10. Therapeutic Exercise/Strengthening  11. Ultrasound (US)   TREATMENT PLAN:  Effective Dates: 18 TO 18. Frequency/Duration: 2 times a week for 8 weeks  GOALS: (Goals have been discussed and agreed upon with patient.)  Short-Term Functional Goals: Time Frame: 4 weeks  1. Pt will be I with phase appropriate HEP to assist with R ROM surgical knee. 2. Pt will improve extension knee to 0 degrees to assist with gait. Discharge Goals: Time Frame: 8 weeks  1.  Pt will improve R knee flexion to 125 degrees or greater to assist with stair climbing. 2. Pt will demonstrate an non-antalgic gait pattern without assistive device on level and uneven surfaces SPV. 3. Pt  Will improve strength R quads and hamstrings to 5/5 to assist with return to recreational activities. Rehabilitation Potential For Stated Goals: Good  Regarding Oanh Montejo's therapy, I certify that the treatment plan above will be carried out by a therapist or under their direction. Thank you for this referral,  Jl Gomez PT     Referring Physician Signature: Octaviano Muñoz MD              Date                    The information in this section was collected on 18 (except where otherwise noted). HISTORY:   History of Present Injury/Illness (Reason for Referral):  R TKA secondary to OA knee  Past Medical History/Comorbidities:   Ms. Sofia Castillo  has a past medical history of Allergic rhinitis; Arthritis; GERD (gastroesophageal reflux disease); and Laryngeal ulceration. Ms. Sofia Castillo  has a past surgical history that includes hx  section (); hx abdominal wall defect repair; and implant breast silicone/eq. Social History/Living Environment:     very active person with family living in private residence  Prior Level of Function/Work/Activity:  I all activities   Personal Factors:          Sex:  female        Age:  49 y.o. Overall Behavior:  motivated  Current Medications:       Current Outpatient Prescriptions:     ascorbic acid, vitamin C, (VITAMIN C) 500 mg tablet, Take 500 mg by mouth two (2) times a day., Disp: , Rfl:     aspirin delayed-release 81 mg tablet, Take 81 mg by mouth two (2) times a day., Disp: , Rfl:     acetaminophen (TYLENOL) 325 mg tablet, Take 325 mg by mouth every six (6) hours as needed for Pain., Disp: , Rfl:     docusate sodium (COLACE) 100 mg capsule, Take 100 mg by mouth daily. , Disp: , Rfl:     senna (SENNA) 8.6 mg tablet, Take 1 Tab by mouth daily. , Disp: , Rfl:     traMADol (ULTRAM) 50 mg tablet, Take 50 mg by mouth every six (6) hours as needed for Pain., Disp: , Rfl:     folic acid (FOLVITE) 1 mg tablet, Take 1 mg by mouth daily. , Disp: , Rfl:     iron polysacch complex-b12-fa (FERREX 150 FORTE) capsule, Take 1 Cap by mouth daily. , Disp: , Rfl:     predniSONE (DELTASONE) 10 mg tablet, 4 PO QD x 2, 3 PO QD x 2, 2 PO QD x 2, 1 PO QD x 2, Disp: 20 Tab, Rfl: 0    HYDROcodone-homatropine (HYCODAN) 5-1.5 mg/5 mL (5 mL) syrup, Take 5 mL by mouth four (4) times daily. Max Daily Amount: 20 mL., Disp: 120 mL, Rfl: 0    omeprazole (PRILOSEC) 40 mg capsule, Take 1 Cap by mouth daily. , Disp: 90 Cap, Rfl: 3    diclofenac-misoprostol (ARTHROTEC 50)  mg-mcg per tablet, Take 1 Tab by mouth two (2) times a day. Indications: OSTEOARTHRITIS IN PATIENT AT HIGH GASTRIC ULCER RISK, Disp: 60 Tab, Rfl: 5    diphenhydrAMINE (BENADRYL ALLERGY) 25 mg tablet, Take 25 mg by mouth every six (6) hours as needed for Sleep., Disp: , Rfl:    Date Last Reviewed:  3/6/2018    Number of Personal Factors/Comorbidities that affect the Plan of Care: 0: LOW COMPLEXITY   EXAMINATION:   Observation/Orthostatic Postural Assessment:          Edema present: R knee above patella R 16, L 15, mid patella R 17.5, L 16, below patella R 16, L 15  ROM:          Knee:  Right: 0 to 128 supine after treatment AAROM with OP  Left: 0 to 127  Strength:          Lower extremities:  Quads 5 degrees extensor lag R, L 5/5  Hamstrings 3+/5 R, 5/5 L  Hip ABD 4-/5 R, 5/5 L  gastocs 4-/5 R, 5/5 L  Functional Mobility:         Gait/Ambulation:  Using straight cane L hand. Antalgic gait, circumduct R LE swing with shortened stance phase R        Transfers:  Shifts L         Stairs:  Not tested today   Body Structures Involved:  1. Bones  2. Joints  3. Muscles  4. Ligaments Body Functions Affected:  1. Sensory/Pain  2. Neuromusculoskeletal Activities and Participation Affected:  1. Mobility  2.  Self Care  3. Domestic Life  4. Interpersonal Interactions and Relationships  5. Community, Social and [place]   Number of elements (examined above) that affect the Plan of Care: 3: MODERATE COMPLEXITY   CLINICAL PRESENTATION:   Presentation: Stable and uncomplicated: LOW COMPLEXITY   CLINICAL DECISION MAKING:   Outcome Measure: Tool Used: Lower Extremity Functional Scale (LEFS)  Score:  Initial: 28/80 Most Recent: X/80 (Date: -- )   Interpretation of Score: 20 questions each scored on a 5 point scale with 0 representing \"extreme difficulty or unable to perform\" and 4 representing \"no difficulty\". The lower the score, the greater the functional disability. 80/80 represents no disability. Minimal detectable change is 9 points. Score 80 79-63 62-48 47-32 31-16 15-1 0   Modifier CH CI CJ CK CL CM CN         Medical Necessity:   · Patient is expected to demonstrate progress in strength, range of motion and functional technique to increase ease with all functional activities. Reason for Services/Other Comments:  · Patient continues to require skilled intervention due to recent TKA. Use of outcome tool(s) and clinical judgement create a POC that gives a: Clear prediction of patient's progress: LOW COMPLEXITY            TREATMENT:   (In addition to Assessment/Re-Assessment sessions the following treatments were rendered)  Pre-treatment Symptoms/Complaints:  Pt states she feels stiff today. Pain: Initial:     2-3/10 R knee Post Session:  2-3/10 R knee     THERAPEUTIC EXERCISE: (30 minutes):  Exercises per grid below to improve mobility and strength. Required minimal visual, verbal and tactile cues to promote proper body alignment, promote proper body posture and promote proper body mechanics. Progressed resistance, range and repetitions as indicated.    Date  3-2-18 Date  03-06-18   Activity/Exercise     Seated knee flexion with over pressure     Prone knee extension  Overpressure 5sx10 Overpressure  5 reps  10 sec holds   [employer] --    SLR all 4 planes 3# 2x10    Nustep --    Standing Heel/Toe Raises x20    Knee Flexion Stretch on Step 10sx10 10 reps  10 sec holds   TKE with T-band Blue x20    Gastroc Slantboard 30sx2 3 reps  20 sec holds   Hamstring Stretch 30sx2 manual assist 2 reps  30 sec holds  Manual   Heel Slides with Strap for OP --    LAQ's 3# 2x10    Airdyne 8 minutes 8 minutes   Nautilus Leg Press 85# 2x10 85 Lbs  20 reps   Step-Ups 6 inch x20 8 inch  20 reps   Wall slide 10sx4 5 reps  10 sec holds   Side step squat  x20 to the R    balance Single leg with hip flex/ext, ABD/ADD On Blue Foam  5 reps to Fatigue   Calf Raises on Leg Press Machine  85 Lbs  20 reps   Standing Balance Board  FW/BW and Sideways  20 reps each    Manual: (15 minutes): mobilization to promote r knee flexion and extension, soft tissue hamstring, gastroc to improve muscle flexibility. Modalities: (15 minutes): pneumatic ice machine L knee. Skin intact following. [facility] Portal  Treatment/Session Assessment:    · Response to Treatment:  Pt having some stiffness in knee this morning. Mobility improved with treatments. · Compliance with Program/Exercises: compliant. · Recommendations/Intent for next treatment session: \"Next visit will focus on advancements to more challenging activities\".   Total Treatment Duration:  60 minutes  PT Patient Time In/Time Out  Time In: 0815  Time Out: [address] [facility] Sohan Villasenor, PT

## 2018-03-08 ENCOUNTER — HOSPITAL ENCOUNTER (OUTPATIENT)
Dept: PHYSICAL THERAPY | Age: 48
Discharge: HOME | End: 2018-03-08
Payer: COMMERCIAL

## 2018-03-08 PROCEDURE — 97110 THERAPEUTIC EXERCISES: CPT

## 2018-03-08 PROCEDURE — 97140 MANUAL THERAPY 1/> REGIONS: CPT

## 2018-03-08 NOTE — PROGRESS NOTES
Iban Murillo  : 1970  Primary: [employer]  Secondary:  [address] at [address] [id number] [facility], [address] [place] [address],[address], [address] [facility]  [phone]   [phone]          OUTPATIENT PHYSICAL THERAPY:Daily Note 3/8/2018    ICD-10: Treatment Diagnosis:   · Pain in right knee (M25.561)  · Stiffness of right knee, not elsewhere classified (M25.661)      Precautions/Allergies:   Review of patient's allergies indicates no known allergies. Fall Risk Score: 0 (? 5 = High Risk)  MD Orders: eval and treat MEDICAL/REFERRING DIAGNOSIS:  degenerative joint disease of lower leg   DATE OF ONSET: 3 weeks ago  REFERRING PHYSICIAN: Pablo Núñez MD  RETURN PHYSICIAN APPOINTMENT: unsure date     INITIAL ASSESSMENT:  Ms. Coty Madrigal presents s/p R TKA. She is a very active person who will benefit from PT services to get back to full I with work, family, and recreational activities. PROBLEM LIST (Impacting functional limitations):  1. Decreased Strength  2. Decreased ADL/Functional Activities  3. Decreased Ambulation Ability/Technique  4. Increased Pain  5. Decreased Activity Tolerance  6. Decreased Flexibility/Joint Mobility  7. Decreased [place] with Home Exercise Program INTERVENTIONS PLANNED:  1. Cold  2. Cryotherapy  3. Electrical Stimulation  4. Gait Training  5. Heat  6. Home Exercise Program (HEP)  7. Manual Therapy  8. Range of Motion (ROM)  9. Therapeutic Activites  10. Therapeutic Exercise/Strengthening  11. Ultrasound (US)   TREATMENT PLAN:  Effective Dates: 18 TO 18. Frequency/Duration: 2 times a week for 8 weeks  GOALS: (Goals have been discussed and agreed upon with patient.)  Short-Term Functional Goals: Time Frame: 4 weeks  1. Pt will be I with phase appropriate HEP to assist with R ROM surgical knee. 2. Pt will improve extension knee to 0 degrees to assist with gait. Discharge Goals: Time Frame: 8 weeks  1.  Pt will improve R knee flexion to 125 degrees or greater to assist with stair climbing. 2. Pt will demonstrate an non-antalgic gait pattern without assistive device on level and uneven surfaces SPV. 3. Pt  Will improve strength R quads and hamstrings to 5/5 to assist with return to recreational activities. Rehabilitation Potential For Stated Goals: Good  Regarding Digna Hatchet Wannemacher's therapy, I certify that the treatment plan above will be carried out by a therapist or under their direction. Thank you for this referral,  Neel Orr PT     Referring Physician Signature: Tressa Ward MD              Date                    The information in this section was collected on 18 (except where otherwise noted). HISTORY:   History of Present Injury/Illness (Reason for Referral):  R TKA secondary to OA knee  Past Medical History/Comorbidities:   Ms. Blanca Carter  has a past medical history of Allergic rhinitis; Arthritis; GERD (gastroesophageal reflux disease); and Laryngeal ulceration. Ms. Blanca Carter  has a past surgical history that includes hx  section (); hx abdominal wall defect repair; and implant breast silicone/eq. Social History/Living Environment:     very active person with family living in private residence  Prior Level of Function/Work/Activity:  I all activities   Personal Factors:          Sex:  female        Age:  49 y.o. Overall Behavior:  motivated  Current Medications:       Current Outpatient Prescriptions:     ascorbic acid, vitamin C, (VITAMIN C) 500 mg tablet, Take 500 mg by mouth two (2) times a day., Disp: , Rfl:     aspirin delayed-release 81 mg tablet, Take 81 mg by mouth two (2) times a day., Disp: , Rfl:     acetaminophen (TYLENOL) 325 mg tablet, Take 325 mg by mouth every six (6) hours as needed for Pain., Disp: , Rfl:     docusate sodium (COLACE) 100 mg capsule, Take 100 mg by mouth daily. , Disp: , Rfl:     senna (SENNA) 8.6 mg tablet, Take 1 Tab by mouth daily. , Disp: , Rfl:     traMADol (ULTRAM) 50 mg tablet, Take 50 mg by mouth every six (6) hours as needed for Pain., Disp: , Rfl:     folic acid (FOLVITE) 1 mg tablet, Take 1 mg by mouth daily. , Disp: , Rfl:     iron polysacch complex-b12-fa (FERREX 150 FORTE) capsule, Take 1 Cap by mouth daily. , Disp: , Rfl:     predniSONE (DELTASONE) 10 mg tablet, 4 PO QD x 2, 3 PO QD x 2, 2 PO QD x 2, 1 PO QD x 2, Disp: 20 Tab, Rfl: 0    HYDROcodone-homatropine (HYCODAN) 5-1.5 mg/5 mL (5 mL) syrup, Take 5 mL by mouth four (4) times daily. Max Daily Amount: 20 mL., Disp: 120 mL, Rfl: 0    omeprazole (PRILOSEC) 40 mg capsule, Take 1 Cap by mouth daily. , Disp: 90 Cap, Rfl: 3    diclofenac-misoprostol (ARTHROTEC 50)  mg-mcg per tablet, Take 1 Tab by mouth two (2) times a day. Indications: OSTEOARTHRITIS IN PATIENT AT HIGH GASTRIC ULCER RISK, Disp: 60 Tab, Rfl: 5    diphenhydrAMINE (BENADRYL ALLERGY) 25 mg tablet, Take 25 mg by mouth every six (6) hours as needed for Sleep., Disp: , Rfl:    Date Last Reviewed:  3/8/2018    Number of Personal Factors/Comorbidities that affect the Plan of Care: 0: LOW COMPLEXITY   EXAMINATION:   Observation/Orthostatic Postural Assessment:          Edema present: R knee above patella R 16, L 15, mid patella R 17.5, L 16, below patella R 16, L 15  ROM:          Knee:  Right: 0 to 130 supine after treatment AAROM with OP  Left: 0 to 127  Strength:          Lower extremities:  Quads 5 degrees extensor lag R, L 5/5  Hamstrings 3+/5 R, 5/5 L  Hip ABD 4-/5 R, 5/5 L  gastocs 4-/5 R, 5/5 L  Functional Mobility:         Gait/Ambulation:  Using straight cane L hand. Antalgic gait, circumduct R LE swing with shortened stance phase R        Transfers:  Shifts L         Stairs:  Not tested today   Body Structures Involved:  1. Bones  2. Joints  3. Muscles  4. Ligaments Body Functions Affected:  1. Sensory/Pain  2. Neuromusculoskeletal Activities and Participation Affected:  1. Mobility  2.  Self Care  3. Domestic Life  4. Interpersonal Interactions and Relationships  5. Community, Social and [place]   Number of elements (examined above) that affect the Plan of Care: 3: MODERATE COMPLEXITY   CLINICAL PRESENTATION:   Presentation: Stable and uncomplicated: LOW COMPLEXITY   CLINICAL DECISION MAKING:   Outcome Measure: Tool Used: Lower Extremity Functional Scale (LEFS)  Score:  Initial: 28/80 Most Recent: X/80 (Date: -- )   Interpretation of Score: 20 questions each scored on a 5 point scale with 0 representing \"extreme difficulty or unable to perform\" and 4 representing \"no difficulty\". The lower the score, the greater the functional disability. 80/80 represents no disability. Minimal detectable change is 9 points. Score 80 79-63 62-48 47-32 31-16 15-1 0   Modifier CH CI CJ CK CL CM CN         Medical Necessity:   · Patient is expected to demonstrate progress in strength, range of motion and functional technique to increase ease with all functional activities. Reason for Services/Other Comments:  · Patient continues to require skilled intervention due to recent TKA. Use of outcome tool(s) and clinical judgement create a POC that gives a: Clear prediction of patient's progress: LOW COMPLEXITY            TREATMENT:   (In addition to Assessment/Re-Assessment sessions the following treatments were rendered)  Pre-treatment Symptoms/Complaints:  Pt states her R knee feels stiff today. Pain: Initial:     2-3/10 R knee Post Session:  2-3/10 R knee     THERAPEUTIC EXERCISE: (35 minutes):  Exercises per grid below to improve mobility and strength. Required minimal visual, verbal and tactile cues to promote proper body alignment, promote proper body posture and promote proper body mechanics. Progressed resistance, range and repetitions as indicated.    Date  3-2-18 Date  03-06-18 Date  03-08-18   Activity/Exercise      Seated knee flexion with over pressure      Prone knee extension  Overpressure 5sx10 Overpressure  5 reps  10 sec holds Overpressure  5 reps  10 sec holds   Quad sets --     SLR all 4 planes 3# 2x10     Nustep --     Standing Heel/Toe Raises x20     Knee Flexion Stretch on Step 10sx10 10 reps  10 sec holds 10 reps  10 sec holds   TKE with T-band Blue x20  Black  20 reps   Gastroc Slantboard 30sx2 3 reps  20 sec holds 3 reps  20 sec holds   Hamstring Stretch 30sx2 manual assist 2 reps  30 sec holds  Manual 2 reps  30 sec holds  Manual   Heel Slides with Strap for OP --     LAQ's 3# 2x10     Airdyne 8 minutes 8 minutes 8 minutes    Nautilus Leg Press 85# 2x10 85 Lbs  20 reps 85 Lbs  20 reps   Step-Ups 6 inch x20 8 inch  20 reps 8 inch  20 reps   Wall slide 10sx4 5 reps  10 sec holds    Side step squat  x20 to the R     balance Single leg with hip flex/ext, ABD/ADD On Blue Foam  5 reps to Fatigue    Calf Raises on Leg Press Machine  85 Lbs  20 reps 85 Lbs  20 reps   Standing Balance Board  FW/BW and Sideways  20 reps each FW/BW and Sideways  20 reps each   Nautilus Leg Curls   40 Lbs  20 reps   Stair Training   Reciprocal Pattern  12 steps    Manual: (20 minutes): mobilization to promote r knee flexion and extension, soft tissue hamstring, gastroc to improve muscle flexibility. Modalities: (Pt declined): pneumatic ice machine L knee. Skin intact following. [facility] Portal  Treatment/Session Assessment:    · Response to Treatment:  Pt tolerated treatments well. ROM continued to improve. · Compliance with Program/Exercises: compliant. · Recommendations/Intent for next treatment session: \"Next visit will focus on advancements to more challenging activities\".   Total Treatment Duration:  55 minutes  PT Patient Time In/Time Out  Time In: 1600  Time Out: [address] Sohan Villasenor PT

## 2018-03-13 ENCOUNTER — HOSPITAL ENCOUNTER (OUTPATIENT)
Dept: PHYSICAL THERAPY | Age: 48
Discharge: HOME | End: 2018-03-13
Payer: COMMERCIAL

## 2018-03-13 PROCEDURE — 97110 THERAPEUTIC EXERCISES: CPT

## 2018-03-13 PROCEDURE — 97140 MANUAL THERAPY 1/> REGIONS: CPT

## 2018-03-13 NOTE — PROGRESS NOTES
Lora Wolff  : 1970  Primary: [employer]  Secondary:  [address] at [place] [address] [facility], [address] [place] [address],[address], [facility]. .  [phone]   [phone]          OUTPATIENT PHYSICAL THERAPY:Daily Note 3/13/2018    ICD-10: Treatment Diagnosis:   · Pain in right knee (M25.561)  · Stiffness of right knee, not elsewhere classified (M25.661)      Precautions/Allergies:   Review of patient's allergies indicates no known allergies. Fall Risk Score: 0 (? 5 = High Risk)  MD Orders: eval and treat MEDICAL/REFERRING DIAGNOSIS:  degenerative joint disease of lower leg   DATE OF ONSET: 3 weeks ago  REFERRING PHYSICIAN: Kathy Kumar MD  RETURN PHYSICIAN APPOINTMENT: unsure date     INITIAL ASSESSMENT:  Ms. Cori Escalante presents s/p R TKA. She is a very active person who will benefit from PT services to get back to full I with work, family, and recreational activities. PROBLEM LIST (Impacting functional limitations):  1. Decreased Strength  2. Decreased ADL/Functional Activities  3. Decreased Ambulation Ability/Technique  4. Increased Pain  5. Decreased Activity Tolerance  6. Decreased Flexibility/Joint Mobility  7. Decreased [place] with Home Exercise Program INTERVENTIONS PLANNED:  1. Cold  2. Cryotherapy  3. Electrical Stimulation  4. Gait Training  5. Heat  6. Home Exercise Program (HEP)  7. Manual Therapy  8. Range of Motion (ROM)  9. Therapeutic Activites  10. Therapeutic Exercise/Strengthening  11. Ultrasound (US)   TREATMENT PLAN:  Effective Dates: 18 TO 18. Frequency/Duration: 2 times a week for 8 weeks  GOALS: (Goals have been discussed and agreed upon with patient.)  Short-Term Functional Goals: Time Frame: 4 weeks  1. Pt will be I with phase appropriate HEP to assist with R ROM surgical knee. 2. Pt will improve extension knee to 0 degrees to assist with gait. Discharge Goals: Time Frame: 8 weeks  1.  Pt will improve R knee flexion to 125 degrees or greater to assist with stair climbing. 2. Pt will demonstrate an non-antalgic gait pattern without assistive device on level and uneven surfaces SPV. 3. Pt  Will improve strength R quads and hamstrings to 5/5 to assist with return to recreational activities. Rehabilitation Potential For Stated Goals: Good  Regarding Jailyn Montejo's therapy, I certify that the treatment plan above will be carried out by a therapist or under their direction. Thank you for this referral,  Porsha Shaver PT     Referring Physician Signature: Seda Mortensen MD              Date                    The information in this section was collected on 18 (except where otherwise noted). HISTORY:   History of Present Injury/Illness (Reason for Referral):  R TKA secondary to OA knee  Past Medical History/Comorbidities:   Ms. Shar Leach  has a past medical history of Allergic rhinitis; Arthritis; GERD (gastroesophageal reflux disease); and Laryngeal ulceration. Ms. Shar Leach  has a past surgical history that includes hx  section (); hx abdominal wall defect repair; and implant breast silicone/eq. Social History/Living Environment:     very active person with family living in private residence  Prior Level of Function/Work/Activity:  I all activities   Personal Factors:          Sex:  female        Age:  49 y.o. Overall Behavior:  motivated  Current Medications:       Current Outpatient Prescriptions:     ascorbic acid, vitamin C, (VITAMIN C) 500 mg tablet, Take 500 mg by mouth two (2) times a day., Disp: , Rfl:     aspirin delayed-release 81 mg tablet, Take 81 mg by mouth two (2) times a day., Disp: , Rfl:     acetaminophen (TYLENOL) 325 mg tablet, Take 325 mg by mouth every six (6) hours as needed for Pain., Disp: , Rfl:     docusate sodium (COLACE) 100 mg capsule, Take 100 mg by mouth daily. , Disp: , Rfl:     senna (SENNA) 8.6 mg tablet, Take 1 Tab by mouth daily. , Disp: , Rfl:     traMADol (ULTRAM) 50 mg tablet, Take 50 mg by mouth every six (6) hours as needed for Pain., Disp: , Rfl:     folic acid (FOLVITE) 1 mg tablet, Take 1 mg by mouth daily. , Disp: , Rfl:     iron polysacch complex-b12-fa (FERREX 150 FORTE) capsule, Take 1 Cap by mouth daily. , Disp: , Rfl:     predniSONE (DELTASONE) 10 mg tablet, 4 PO QD x 2, 3 PO QD x 2, 2 PO QD x 2, 1 PO QD x 2, Disp: 20 Tab, Rfl: 0    HYDROcodone-homatropine (HYCODAN) 5-1.5 mg/5 mL (5 mL) syrup, Take 5 mL by mouth four (4) times daily. Max Daily Amount: 20 mL., Disp: 120 mL, Rfl: 0    omeprazole (PRILOSEC) 40 mg capsule, Take 1 Cap by mouth daily. , Disp: 90 Cap, Rfl: 3    diclofenac-misoprostol (ARTHROTEC 50)  mg-mcg per tablet, Take 1 Tab by mouth two (2) times a day. Indications: OSTEOARTHRITIS IN PATIENT AT HIGH GASTRIC ULCER RISK, Disp: 60 Tab, Rfl: 5    diphenhydrAMINE (BENADRYL ALLERGY) 25 mg tablet, Take 25 mg by mouth every six (6) hours as needed for Sleep., Disp: , Rfl:    Date Last Reviewed:  3/13/2018    Number of Personal Factors/Comorbidities that affect the Plan of Care: 0: LOW COMPLEXITY   EXAMINATION:   Observation/Orthostatic Postural Assessment:          Edema present: R knee above patella R 16, L 15, mid patella R 17.5, L 16, below patella R 16, L 15  ROM:          Knee:  Right: 0 to 130 supine after treatment AAROM with OP  Left: 0 to 127  Strength:          Lower extremities:  Quads 5 degrees extensor lag R, L 5/5  Hamstrings 3+/5 R, 5/5 L  Hip ABD 4-/5 R, 5/5 L  gastocs 4-/5 R, 5/5 L  Functional Mobility:         Gait/Ambulation:  Using straight cane L hand. Antalgic gait, circumduct R LE swing with shortened stance phase R        Transfers:  Shifts L         Stairs:  Not tested today   Body Structures Involved:  1. Bones  2. Joints  3. Muscles  4. Ligaments Body Functions Affected:  1. Sensory/Pain  2. Neuromusculoskeletal Activities and Participation Affected:  1. Mobility  2.  Self Care  3. Domestic Life  4. Interpersonal Interactions and Relationships  5. Community, Social and [place]   Number of elements (examined above) that affect the Plan of Care: 3: MODERATE COMPLEXITY   CLINICAL PRESENTATION:   Presentation: Stable and uncomplicated: LOW COMPLEXITY   CLINICAL DECISION MAKING:   Outcome Measure: Tool Used: Lower Extremity Functional Scale (LEFS)  Score:  Initial: 28/80 Most Recent: X/80 (Date: -- )   Interpretation of Score: 20 questions each scored on a 5 point scale with 0 representing \"extreme difficulty or unable to perform\" and 4 representing \"no difficulty\". The lower the score, the greater the functional disability. 80/80 represents no disability. Minimal detectable change is 9 points. Score 80 79-63 62-48 47-32 31-16 15-1 0   Modifier CH CI CJ CK CL CM CN         Medical Necessity:   · Patient is expected to demonstrate progress in strength, range of motion and functional technique to increase ease with all functional activities. Reason for Services/Other Comments:  · Patient continues to require skilled intervention due to recent TKA. Use of outcome tool(s) and clinical judgement create a POC that gives a: Clear prediction of patient's progress: LOW COMPLEXITY            TREATMENT:   (In addition to Assessment/Re-Assessment sessions the following treatments were rendered)  Pre-treatment Symptoms/Complaints:  Pt states her R knee feels stiff today. Pain: Initial:     2-3/10 R knee Post Session:  2-3/10 R knee     THERAPEUTIC EXERCISE: (35 minutes):  Exercises per grid below to improve mobility and strength. Required minimal visual, verbal and tactile cues to promote proper body alignment, promote proper body posture and promote proper body mechanics. Progressed resistance, range and repetitions as indicated.    Date  3-2-18 Date  03-06-18 Date  03-08-18 Date  3-13-18   Activity/Exercise       Seated knee flexion with over pressure       Prone knee extension Overpressure 5sx10 Overpressure  5 reps  10 sec holds Overpressure  5 reps  10 sec holds    Quad sets --      SLR all 4 planes 3# 2x10      Nustep --      Standing Heel/Toe Raises x20      Knee Flexion Stretch on Step 10sx10 10 reps  10 sec holds 10 reps  10 sec holds 10x10s   TKE with T-band Blue x20  Black  20 reps Black x20   Gastroc Slantboard 30sx2 3 reps  20 sec holds 3 reps  20 sec holds 30sx2   Hamstring Stretch 30sx2 manual assist 2 reps  30 sec holds  Manual 2 reps  30 sec holds  Manual manual   Heel Slides with Strap for OP --   --   LAQ's 3# 2x10   5# 2x10   Airdyne 8 minutes 8 minutes 8 minutes  8 min    Nautilus Leg Press 85# 2x10 85 Lbs  20 reps 85 Lbs  20 reps 35# single leg  2x10   Step-Ups 6 inch x20 8 inch  20 reps 8 inch  20 reps 8 inch x20   Wall slide 10sx4 5 reps  10 sec holds  5sx10   Side step squat  x20 to the R   50ft   balance Single leg with hip flex/ext, ABD/ADD On Blue Foam  5 reps to Fatigue     Calf Raises on Leg Press Machine  85 Lbs  20 reps 85 Lbs  20 reps 85# x20   Standing Balance Board  FW/BW and Sideways  20 reps each FW/BW and Sideways  20 reps each FW/BW and sideways x20   Nautilus Leg Curls   40 Lbs  20 reps 40# x20   Stair Training   Reciprocal Pattern  12 steps reciprocal flight stairs    Manual: (20 minutes): mobilization to promote r knee flexion and extension, soft tissue hamstring, gastroc to improve muscle flexibility. Modalities: (Pt declined): pneumatic ice machine L knee. Skin intact following. [facility]  Treatment/Session Assessment:    · Response to Treatment:  Pt tolerated treatments well. ROM continued to improve. Keep pushing strength  · Compliance with Program/Exercises: compliant. · Recommendations/Intent for next treatment session: \"Next visit will focus on advancements to more challenging activities\".   Total Treatment Duration:  55 minutes  PT Patient Time In/Time Out  Time In: 0830  Time Out: 330 Charles Gibbons, PT

## 2018-03-15 ENCOUNTER — HOSPITAL ENCOUNTER (OUTPATIENT)
Dept: PHYSICAL THERAPY | Age: 48
Discharge: HOME | End: 2018-03-15
Payer: COMMERCIAL

## 2018-03-15 PROCEDURE — 97110 THERAPEUTIC EXERCISES: CPT

## 2018-03-15 PROCEDURE — 97140 MANUAL THERAPY 1/> REGIONS: CPT

## 2018-03-15 NOTE — PROGRESS NOTES
Francesca Waltondriss  : 1970  Primary: [employer]  Secondary:  [address] at [place] [address] [facility], [address] [place] [address],[address], [facility]. [address].  [phone]   [phone]          OUTPATIENT PHYSICAL THERAPY:Daily Note 3/15/2018    ICD-10: Treatment Diagnosis:   · Pain in right knee (M25.561)  · Stiffness of right knee, not elsewhere classified (M25.661)      Precautions/Allergies:   Review of patient's allergies indicates no known allergies. Fall Risk Score: 0 (? 5 = High Risk)  MD Orders: eval and treat MEDICAL/REFERRING DIAGNOSIS:  degenerative joint disease of lower leg   DATE OF ONSET: 3 weeks ago  REFERRING PHYSICIAN: Cj Davis MD  RETURN PHYSICIAN APPOINTMENT: unsure date     INITIAL ASSESSMENT:  Ms. Angus Merlin presents s/p R TKA. She is a very active person who will benefit from PT services to get back to full I with work, family, and recreational activities. PROBLEM LIST (Impacting functional limitations):  1. Decreased Strength  2. Decreased ADL/Functional Activities  3. Decreased Ambulation Ability/Technique  4. Increased Pain  5. Decreased Activity Tolerance  6. Decreased Flexibility/Joint Mobility  7. Decreased [place] with Home Exercise Program INTERVENTIONS PLANNED:  1. Cold  2. Cryotherapy  3. Electrical Stimulation  4. Gait Training  5. Heat  6. Home Exercise Program (HEP)  7. Manual Therapy  8. Range of Motion (ROM)  9. Therapeutic Activites  10. Therapeutic Exercise/Strengthening  11. Ultrasound (US)   TREATMENT PLAN:  Effective Dates: 18 TO 18. Frequency/Duration: 2 times a week for 8 weeks  GOALS: (Goals have been discussed and agreed upon with patient.)  Short-Term Functional Goals: Time Frame: 4 weeks  1. Pt will be I with phase appropriate HEP to assist with R ROM surgical knee. 2. Pt will improve extension knee to 0 degrees to assist with gait. Discharge Goals: Time Frame: 8 weeks  1.  Pt will improve R knee flexion to 125 degrees or greater to assist with stair climbing. 2. Pt will demonstrate an non-antalgic gait pattern without assistive device on level and uneven surfaces SPV. 3. Pt  Will improve strength R quads and hamstrings to 5/5 to assist with return to recreational activities. Rehabilitation Potential For Stated Goals: Good  Regarding Leighann Gilmore Gustabo's therapy, I certify that the treatment plan above will be carried out by a therapist or under their direction. Thank you for this referral,  Victoriano Spatz, PT     Referring Physician Signature: Gibran Castillo MD              Date                    The information in this section was collected on 18 (except where otherwise noted). HISTORY:   History of Present Injury/Illness (Reason for Referral):  R TKA secondary to OA knee  Past Medical History/Comorbidities:   Ms. Sandrine De La Garza  has a past medical history of Allergic rhinitis; Arthritis; GERD (gastroesophageal reflux disease); and Laryngeal ulceration. Ms. Sandrine De La Garza  has a past surgical history that includes hx  section (); hx abdominal wall defect repair; and implant breast silicone/eq. Social History/Living Environment:     very active person with family living in private residence  Prior Level of Function/Work/Activity:  I all activities   Personal Factors:          Sex:  female        Age:  49 y.o. Overall Behavior:  motivated  Current Medications:       Current Outpatient Prescriptions:     ascorbic acid, vitamin C, (VITAMIN C) 500 mg tablet, Take 500 mg by mouth two (2) times a day., Disp: , Rfl:     aspirin delayed-release 81 mg tablet, Take 81 mg by mouth two (2) times a day., Disp: , Rfl:     acetaminophen (TYLENOL) 325 mg tablet, Take 325 mg by mouth every six (6) hours as needed for Pain., Disp: , Rfl:     docusate sodium (COLACE) 100 mg capsule, Take 100 mg by mouth daily. , Disp: , Rfl:     senna (SENNA) 8.6 mg tablet, Take 1 Tab by mouth daily. , Disp: , Rfl:     traMADol (ULTRAM) 50 mg tablet, Take 50 mg by mouth every six (6) hours as needed for Pain., Disp: , Rfl:     folic acid (FOLVITE) 1 mg tablet, Take 1 mg by mouth daily. , Disp: , Rfl:     iron polysacch complex-b12-fa (FERREX 150 FORTE) capsule, Take 1 Cap by mouth daily. , Disp: , Rfl:     predniSONE (DELTASONE) 10 mg tablet, 4 PO QD x 2, 3 PO QD x 2, 2 PO QD x 2, 1 PO QD x 2, Disp: 20 Tab, Rfl: 0    HYDROcodone-homatropine (HYCODAN) 5-1.5 mg/5 mL (5 mL) syrup, Take 5 mL by mouth four (4) times daily. Max Daily Amount: 20 mL., Disp: 120 mL, Rfl: 0    omeprazole (PRILOSEC) 40 mg capsule, Take 1 Cap by mouth daily. , Disp: 90 Cap, Rfl: 3    diclofenac-misoprostol (ARTHROTEC 50)  mg-mcg per tablet, Take 1 Tab by mouth two (2) times a day. Indications: OSTEOARTHRITIS IN PATIENT AT HIGH GASTRIC ULCER RISK, Disp: 60 Tab, Rfl: 5    diphenhydrAMINE (BENADRYL ALLERGY) 25 mg tablet, Take 25 mg by mouth every six (6) hours as needed for Sleep., Disp: , Rfl:    Date Last Reviewed:  3/15/2018    Number of Personal Factors/Comorbidities that affect the Plan of Care: 0: LOW COMPLEXITY   EXAMINATION:   Observation/Orthostatic Postural Assessment:          Edema present: R knee above patella R 16, L 15, mid patella R 17.5, L 16, below patella R 16, L 15  ROM:          Knee:  Right: 0 to 130 supine after treatment AAROM with OP  Left: 0 to 127  Strength:          Lower extremities:  Quads 5 degrees extensor lag R, L 5/5  Hamstrings 3+/5 R, 5/5 L  Hip ABD 4-/5 R, 5/5 L  gastocs 4-/5 R, 5/5 L  Functional Mobility:         Gait/Ambulation:  Using straight cane L hand. Antalgic gait, circumduct R LE swing with shortened stance phase R        Transfers:  Shifts L         Stairs:  Not tested today   Body Structures Involved:  1. Bones  2. Joints  3. Muscles  4. Ligaments Body Functions Affected:  1. Sensory/Pain  2. Neuromusculoskeletal Activities and Participation Affected:  1. Mobility  2.  Self Care  3. Domestic Life  4. Interpersonal Interactions and Relationships  5. Community, Social and [place]   Number of elements (examined above) that affect the Plan of Care: 3: MODERATE COMPLEXITY   CLINICAL PRESENTATION:   Presentation: Stable and uncomplicated: LOW COMPLEXITY   CLINICAL DECISION MAKING:   Outcome Measure: Tool Used: Lower Extremity Functional Scale (LEFS)  Score:  Initial: 28/80 Most Recent: X/80 (Date: -- )   Interpretation of Score: 20 questions each scored on a 5 point scale with 0 representing \"extreme difficulty or unable to perform\" and 4 representing \"no difficulty\". The lower the score, the greater the functional disability. 80/80 represents no disability. Minimal detectable change is 9 points. Score 80 79-63 62-48 47-32 31-16 15-1 0   Modifier CH CI CJ CK CL CM CN         Medical Necessity:   · Patient is expected to demonstrate progress in strength, range of motion and functional technique to increase ease with all functional activities. Reason for Services/Other Comments:  · Patient continues to require skilled intervention due to recent TKA. Use of outcome tool(s) and clinical judgement create a POC that gives a: Clear prediction of patient's progress: LOW COMPLEXITY            TREATMENT:   (In addition to Assessment/Re-Assessment sessions the following treatments were rendered)  Pre-treatment Symptoms/Complaints:  Pt states her R knee is feeling pretty good today. Pain: Initial:     2-3/10 R knee Post Session:  2-3/10 R knee     THERAPEUTIC EXERCISE: (30 minutes):  Exercises per grid below to improve mobility and strength. Required minimal visual, verbal and tactile cues to promote proper body alignment, promote proper body posture and promote proper body mechanics. Progressed resistance, range and repetitions as indicated.    Date  3-2-18 Date  03-06-18 Date  03-08-18 Date  3-13-18 Date  03-15-18   Activity/Exercise        Seated knee flexion with over pressure Prone knee extension  Overpressure 5sx10 Overpressure  5 reps  10 sec holds Overpressure  5 reps  10 sec holds     Quad sets --       SLR all 4 planes 3# 2x10       Nustep --       Standing Heel/Toe Raises x20       Knee Flexion Stretch on Step 10sx10 10 reps  10 sec holds 10 reps  10 sec holds 10x10s 10 reps  10 sec holds   TKE with T-band Blue x20  Black  20 reps Black x20 Black  20 reps   Gastroc Slantboard 30sx2 3 reps  20 sec holds 3 reps  20 sec holds 30sx2 3 reps  30 sec holds   Hamstring Stretch 30sx2 manual assist 2 reps  30 sec holds  Manual 2 reps  30 sec holds  Manual manual Manual   Heel Slides with Strap for OP --   --    LAQ's 3# 2x10   5# 2x10    Airdyne 8 minutes 8 minutes 8 minutes  8 min  8 minutes   Nautilus Leg Press 85# 2x10 85 Lbs  20 reps 85 Lbs  20 reps 35# single leg  2x10 35 Lbs  20 reps  R LE only   Step-Ups 6 inch x20 8 inch  20 reps 8 inch  20 reps 8 inch x20    Wall slide 10sx4 5 reps  10 sec holds  5sx10 10 reps  5 sec holds   Side step squat  x20 to the R   50ft    balance Single leg with hip flex/ext, ABD/ADD On Blue Foam  5 reps to Fatigue      Calf Raises on Leg Press Machine  85 Lbs  20 reps 85 Lbs  20 reps 85# x20 85 Lbs  20 reps   Standing Balance Board  FW/BW and Sideways  20 reps each FW/BW and Sideways  20 reps each FW/BW and sideways x20    Nautilus Leg Curls   40 Lbs  20 reps 40# x20 40 Lbs  20 reps   Stair Training   Reciprocal Pattern  12 steps reciprocal flight stairs     Manual: (15 minutes): mobilization to promote r knee flexion and extension, soft tissue hamstring, gastroc to improve muscle flexibility. Modalities: (Pt declined): pneumatic ice machine L knee. Skin intact following. [facility] Portal  Treatment/Session Assessment:    · Response to Treatment:  Pt tolerated treatments well. ROM and strength progressing well. · Compliance with Program/Exercises: compliant. · Recommendations/Intent for next treatment session:  \"Next visit will focus on advancements to more challenging activities\".   Total Treatment Duration:  45 minutes  PT Patient Time In/Time Out  Time In: 1015  Time Out: One Shannon De Dios, PT

## 2018-03-20 ENCOUNTER — HOSPITAL ENCOUNTER (OUTPATIENT)
Dept: PHYSICAL THERAPY | Age: 48
Discharge: HOME | End: 2018-03-20
Payer: COMMERCIAL

## 2018-03-20 PROCEDURE — 97140 MANUAL THERAPY 1/> REGIONS: CPT

## 2018-03-20 PROCEDURE — 97110 THERAPEUTIC EXERCISES: CPT

## 2018-03-20 NOTE — PROGRESS NOTES
Christiano Galeano  : 1970  Primary: [employer]  Secondary:  [address] at [place] [id number] [facility], [address] [place] [address],[address], [address] [facility]  [phone]   [phone]          OUTPATIENT PHYSICAL THERAPY:Daily Note 3/20/2018    ICD-10: Treatment Diagnosis:   · Pain in right knee (M25.561)  · Stiffness of right knee, not elsewhere classified (M25.661)      Precautions/Allergies:   Review of patient's allergies indicates no known allergies. Fall Risk Score: 0 (? 5 = High Risk)  MD Orders: eval and treat MEDICAL/REFERRING DIAGNOSIS:  degenerative joint disease of lower leg   DATE OF ONSET: 3 weeks ago  REFERRING PHYSICIAN: Seda Mortensen MD  RETURN PHYSICIAN APPOINTMENT: unsure date     INITIAL ASSESSMENT:  Ms. Shar Leach presents s/p R TKA. She is a very active person who will benefit from PT services to get back to full I with work, family, and recreational activities. PROBLEM LIST (Impacting functional limitations):  1. Decreased Strength  2. Decreased ADL/Functional Activities  3. Decreased Ambulation Ability/Technique  4. Increased Pain  5. Decreased Activity Tolerance  6. Decreased Flexibility/Joint Mobility  7. Decreased [place] with Home Exercise Program INTERVENTIONS PLANNED:  1. Cold  2. Cryotherapy  3. Electrical Stimulation  4. Gait Training  5. Heat  6. Home Exercise Program (HEP)  7. Manual Therapy  8. Range of Motion (ROM)  9. Therapeutic Activites  10. Therapeutic Exercise/Strengthening  11. Ultrasound (US)   TREATMENT PLAN:  Effective Dates: 18 TO 18. Frequency/Duration: 2 times a week for 8 weeks  GOALS: (Goals have been discussed and agreed upon with patient.)  Short-Term Functional Goals: Time Frame: 4 weeks  1. Pt will be I with phase appropriate HEP to assist with R ROM surgical knee. 2. Pt will improve extension knee to 0 degrees to assist with gait. Discharge Goals: Time Frame: 8 weeks  1.  Pt will improve R knee flexion to 125 degrees or greater to assist with stair climbing. 2. Pt will demonstrate an non-antalgic gait pattern without assistive device on level and uneven surfaces SPV. 3. Pt  Will improve strength R quads and hamstrings to 5/5 to assist with return to recreational activities. Rehabilitation Potential For Stated Goals: Good  Regarding Benny Montejo's therapy, I certify that the treatment plan above will be carried out by a therapist or under their direction. Thank you for this referral,  Raoul Tyler PT     Referring Physician Signature: Hakeem Myers MD              Date                    The information in this section was collected on 18 (except where otherwise noted). HISTORY:   History of Present Injury/Illness (Reason for Referral):  R TKA secondary to OA knee  Past Medical History/Comorbidities:   Ms. Nury Thomas  has a past medical history of Allergic rhinitis; Arthritis; GERD (gastroesophageal reflux disease); and Laryngeal ulceration. Ms. Nury Thomas  has a past surgical history that includes hx  section (); hx abdominal wall defect repair; and implant breast silicone/eq. Social History/Living Environment:     very active person with family living in private residence  Prior Level of Function/Work/Activity:  I all activities   Personal Factors:          Sex:  female        Age:  49 y.o. Overall Behavior:  motivated  Current Medications:       Current Outpatient Prescriptions:     ascorbic acid, vitamin C, (VITAMIN C) 500 mg tablet, Take 500 mg by mouth two (2) times a day., Disp: , Rfl:     aspirin delayed-release 81 mg tablet, Take 81 mg by mouth two (2) times a day., Disp: , Rfl:     acetaminophen (TYLENOL) 325 mg tablet, Take 325 mg by mouth every six (6) hours as needed for Pain., Disp: , Rfl:     docusate sodium (COLACE) 100 mg capsule, Take 100 mg by mouth daily. , Disp: , Rfl:     senna (SENNA) 8.6 mg tablet, Take 1 Tab by mouth daily. , Disp: , Rfl:     traMADol (ULTRAM) 50 mg tablet, Take 50 mg by mouth every six (6) hours as needed for Pain., Disp: , Rfl:     folic acid (FOLVITE) 1 mg tablet, Take 1 mg by mouth daily. , Disp: , Rfl:     iron polysacch complex-b12-fa (FERREX 150 FORTE) capsule, Take 1 Cap by mouth daily. , Disp: , Rfl:     predniSONE (DELTASONE) 10 mg tablet, 4 PO QD x 2, 3 PO QD x 2, 2 PO QD x 2, 1 PO QD x 2, Disp: 20 Tab, Rfl: 0    HYDROcodone-homatropine (HYCODAN) 5-1.5 mg/5 mL (5 mL) syrup, Take 5 mL by mouth four (4) times daily. Max Daily Amount: 20 mL., Disp: 120 mL, Rfl: 0    omeprazole (PRILOSEC) 40 mg capsule, Take 1 Cap by mouth daily. , Disp: 90 Cap, Rfl: 3    diclofenac-misoprostol (ARTHROTEC 50)  mg-mcg per tablet, Take 1 Tab by mouth two (2) times a day. Indications: OSTEOARTHRITIS IN PATIENT AT HIGH GASTRIC ULCER RISK, Disp: 60 Tab, Rfl: 5    diphenhydrAMINE (BENADRYL ALLERGY) 25 mg tablet, Take 25 mg by mouth every six (6) hours as needed for Sleep., Disp: , Rfl:    Date Last Reviewed:  3/20/2018    Number of Personal Factors/Comorbidities that affect the Plan of Care: 0: LOW COMPLEXITY   EXAMINATION:   Observation/Orthostatic Postural Assessment:          Edema present: R knee above patella R 16, L 15, mid patella R 17.5, L 16, below patella R 16, L 15  ROM:          Knee:  Right: 0 to 130 supine after treatment AAROM with OP  Left: 0 to 130  Strength:          Lower extremities:  Quads 5 degrees extensor lag R, L 5/5  Hamstrings 3+/5 R, 5/5 L  Hip ABD 4-/5 R, 5/5 L  gastocs 4-/5 R, 5/5 L  Functional Mobility:         Gait/Ambulation:  Using straight cane L hand. Antalgic gait, circumduct R LE swing with shortened stance phase R        Transfers:  Shifts L         Stairs:  Not tested today   Body Structures Involved:  1. Bones  2. Joints  3. Muscles  4. Ligaments Body Functions Affected:  1. Sensory/Pain  2. Neuromusculoskeletal Activities and Participation Affected:  1. Mobility  2.  Self Care  3. Domestic Life  4. Interpersonal Interactions and Relationships  5. Community, Social and [place]   Number of elements (examined above) that affect the Plan of Care: 3: MODERATE COMPLEXITY   CLINICAL PRESENTATION:   Presentation: Stable and uncomplicated: LOW COMPLEXITY   CLINICAL DECISION MAKING:   Outcome Measure: Tool Used: Lower Extremity Functional Scale (LEFS)  Score:  Initial: 28/80 Most Recent: X/80 (Date: -- )   Interpretation of Score: 20 questions each scored on a 5 point scale with 0 representing \"extreme difficulty or unable to perform\" and 4 representing \"no difficulty\". The lower the score, the greater the functional disability. 80/80 represents no disability. Minimal detectable change is 9 points. Score 80 79-63 62-48 47-32 31-16 15-1 0   Modifier CH CI CJ CK CL CM CN         Medical Necessity:   · Patient is expected to demonstrate progress in strength, range of motion and functional technique to increase ease with all functional activities. Reason for Services/Other Comments:  · Patient continues to require skilled intervention due to recent TKA. Use of outcome tool(s) and clinical judgement create a POC that gives a: Clear prediction of patient's progress: LOW COMPLEXITY            TREATMENT:   (In addition to Assessment/Re-Assessment sessions the following treatments were rendered)  Pre-treatment Symptoms/Complaints:  Pt states her R knee is doing well. Pain after a lot of activity, but ice helps  Pain: Initial:     2-3/10 R knee Post Session:  2-3/10 R knee     THERAPEUTIC EXERCISE: (30 minutes):  Exercises per grid below to improve mobility and strength. Required minimal visual, verbal and tactile cues to promote proper body alignment, promote proper body posture and promote proper body mechanics. Progressed resistance, range and repetitions as indicated.    Date  03-20-18      Activity/Exercise       Seated knee flexion with over pressure       Prone knee extension Quad sets       SLR all 4 planes       Nustep       Standing Heel/Toe Raises       Knee Flexion Stretch on Step 10 reps  10 sec holds      TKE with T-band Black  20 reps      Gastroc Slantboard 3 reps  30 sec holds      Hamstring Stretch Manual      Heel Slides with Strap for OP       LAQ's       Airdyne 8 minutes      Nautilus Leg Press 35 Lbs  20 reps  R LE only      Step-Ups       Wall slide 10 reps  5 sec holds      Side step squat        balance       Calf Raises on Leg Press Machine 85 Lbs  20 reps      Standing Balance Board       Nautilus Leg Curls 40 Lbs  20 reps      Stair Training        Manual: (15 minutes): mobilization to promote r knee flexion and extension, soft tissue hamstring, gastroc to improve muscle flexibility. Modalities: (Pt declined): pneumatic ice machine L knee. Skin intact following. [facility]  Treatment/Session Assessment:    · Response to Treatment:  Pt tolerated treatments well. ROM and strength progressing well. · Compliance with Program/Exercises: compliant. · Recommendations/Intent for next treatment session: \"Next visit will focus on advancements to more challenging activities\".   Total Treatment Duration:  45 minutes  PT Patient Time In/Time Out  Time In: 0949  Time Out: Ronal Torre 40, PT

## 2018-03-22 ENCOUNTER — HOSPITAL ENCOUNTER (OUTPATIENT)
Dept: PHYSICAL THERAPY | Age: 48
Discharge: HOME | End: 2018-03-22
Payer: COMMERCIAL

## 2018-03-22 PROCEDURE — 97140 MANUAL THERAPY 1/> REGIONS: CPT

## 2018-03-22 PROCEDURE — 97110 THERAPEUTIC EXERCISES: CPT

## 2018-03-22 NOTE — PROGRESS NOTES
Bettie Carty  : 1970  Primary: [address] at [facility]  [address], [address], [address] [facility]  [phone]   [phone]          OUTPATIENT PHYSICAL THERAPY:Daily Note and Discharge 3/22/2018    ICD-10: Treatment Diagnosis:   · Pain in right knee (M25.561)  · Stiffness of right knee, not elsewhere classified (M25.661)      Precautions/Allergies:   Review of patient's allergies indicates no known allergies. Fall Risk Score: 0 (? 5 = High Risk)  MD Orders: eval and treat MEDICAL/REFERRING DIAGNOSIS:  degenerative joint disease of lower leg   DATE OF ONSET: 3 weeks ago  REFERRING PHYSICIAN: Gibran Castillo MD  RETURN PHYSICIAN APPOINTMENT: unsure date     INITIAL ASSESSMENT:  Ms. Sandrine De La Garza presents s/p R TKA. She has met all her goals and is doing very well. She will DC at this time. PROBLEM LIST (Impacting functional limitations):  1. Decreased Strength  2. Decreased ADL/Functional Activities  3. Decreased Ambulation Ability/Technique  4. Increased Pain  5. Decreased Activity Tolerance  6. Decreased Flexibility/Joint Mobility  7. Decreased [place] with Home Exercise Program INTERVENTIONS PLANNED:  1. Cold  2. Cryotherapy  3. Electrical Stimulation  4. Gait Training  5. Heat  6. Home Exercise Program (HEP)  7. Manual Therapy  8. Range of Motion (ROM)  9. Therapeutic Activites  10. Therapeutic Exercise/Strengthening  11. Ultrasound (US)   TREATMENT PLAN:  Effective Dates: 18 TO 18. Frequency/Duration: 2 times a week for 8 weeks  GOALS: (Goals have been discussed and agreed upon with patient.)  Short-Term Functional Goals: Time Frame: 4 weeks  1. Pt will be I with phase appropriate HEP to assist with R ROM surgical knee. GOAL MET 3/22/2018   2. Pt will improve extension knee to 0 degrees to assist with gait. GOAL MET 3/22/2018   Discharge Goals: Time Frame: 8 weeks  1.  Pt will improve R knee flexion to 125 degrees or greater to assist with stair climbing. GOAL MET 3/22/2018   2. Pt will demonstrate an non-antalgic gait pattern without assistive device on level and uneven surfaces SPV. GOAL MET 3/22/2018   3. Pt  Will improve strength R quads and hamstrings to 5/5 to assist with return to recreational activities. GOAL MET 3/22/2018   Rehabilitation Potential For Stated Goals: Good  Regarding Jammie Garcia Gustabo's therapy, I certify that the treatment plan above will be carried out by a therapist or under their direction. Thank you for this referral,  Shiloh Ospina PT     Referring Physician Signature: Snow Solis MD              Date                    The information in this section was collected on 18 (except where otherwise noted). HISTORY:   History of Present Injury/Illness (Reason for Referral):  R TKA secondary to OA knee  Past Medical History/Comorbidities:   Ms. Cassius Box  has a past medical history of Allergic rhinitis; Arthritis; GERD (gastroesophageal reflux disease); and Laryngeal ulceration. Ms. Cassius Box  has a past surgical history that includes hx  section (); hx abdominal wall defect repair; and implant breast silicone/eq. Social History/Living Environment:     very active person with family living in private residence  Prior Level of Function/Work/Activity:  I all activities   Personal Factors:          Sex:  female        Age:  49 y.o. Overall Behavior:  motivated  Current Medications:       Current Outpatient Prescriptions:     ascorbic acid, vitamin C, (VITAMIN C) 500 mg tablet, Take 500 mg by mouth two (2) times a day., Disp: , Rfl:     aspirin delayed-release 81 mg tablet, Take 81 mg by mouth two (2) times a day., Disp: , Rfl:     acetaminophen (TYLENOL) 325 mg tablet, Take 325 mg by mouth every six (6) hours as needed for Pain., Disp: , Rfl:     docusate sodium (COLACE) 100 mg capsule, Take 100 mg by mouth daily. , Disp: , Rfl:   senna (SENNA) 8.6 mg tablet, Take 1 Tab by mouth daily. , Disp: , Rfl:     traMADol (ULTRAM) 50 mg tablet, Take 50 mg by mouth every six (6) hours as needed for Pain., Disp: , Rfl:     folic acid (FOLVITE) 1 mg tablet, Take 1 mg by mouth daily. , Disp: , Rfl:     iron polysacch complex-b12-fa (FERREX 150 FORTE) capsule, Take 1 Cap by mouth daily. , Disp: , Rfl:     predniSONE (DELTASONE) 10 mg tablet, 4 PO QD x 2, 3 PO QD x 2, 2 PO QD x 2, 1 PO QD x 2, Disp: 20 Tab, Rfl: 0    HYDROcodone-homatropine (HYCODAN) 5-1.5 mg/5 mL (5 mL) syrup, Take 5 mL by mouth four (4) times daily. Max Daily Amount: 20 mL., Disp: 120 mL, Rfl: 0    omeprazole (PRILOSEC) 40 mg capsule, Take 1 Cap by mouth daily. , Disp: 90 Cap, Rfl: 3    diclofenac-misoprostol (ARTHROTEC 50)  mg-mcg per tablet, Take 1 Tab by mouth two (2) times a day. Indications: OSTEOARTHRITIS IN PATIENT AT HIGH GASTRIC ULCER RISK, Disp: 60 Tab, Rfl: 5    diphenhydrAMINE (BENADRYL ALLERGY) 25 mg tablet, Take 25 mg by mouth every six (6) hours as needed for Sleep., Disp: , Rfl:    Date Last Reviewed:  3/22/2018    Number of Personal Factors/Comorbidities that affect the Plan of Care: 0: LOW COMPLEXITY   EXAMINATION:   Observation/Orthostatic Postural Assessment:        Swelling minimal  ROM:          Knee:  Right: 0 to 130 supine after treatment AAROM with OP  Left: 0 to 130  Strength:          Lower extremities:  Quads , L 5/5  Hamstrings 5/5 R, 5/5 L  Hip ABD 5/5 R, 5/5 L  gastocs 4+/5 R, 5/5 L  Functional Mobility:         Gait/Ambulation:  Non-antalgic level surfaces        Transfers:  good         Stairs:  Reciprical, no HR SPV   Body Structures Involved:  1. Bones  2. Joints  3. Muscles  4. Ligaments Body Functions Affected:  1. Sensory/Pain  2. Neuromusculoskeletal Activities and Participation Affected:  1. Mobility  2. Self Care  3. Domestic Life  4. Interpersonal Interactions and Relationships  5.  Community, Social and [place]   Number of elements (examined above) that affect the Plan of Care: 3: MODERATE COMPLEXITY   CLINICAL PRESENTATION:   Presentation: Stable and uncomplicated: LOW COMPLEXITY   CLINICAL DECISION MAKING:   Outcome Measure: Tool Used: Lower Extremity Functional Scale (LEFS)  Score:  Initial: 28/80 Most Recent: 58/80 (Date: 3/22/2018  )   Interpretation of Score: 20 questions each scored on a 5 point scale with 0 representing \"extreme difficulty or unable to perform\" and 4 representing \"no difficulty\". The lower the score, the greater the functional disability. 80/80 represents no disability. Minimal detectable change is 9 points. Score 80 79-63 62-48 47-32 31-16 15-1 0   Modifier CH CI CJ CK CL CM CN     ·    Use of outcome tool(s) and clinical judgement create a POC that gives a: Clear prediction of patient's progress: LOW COMPLEXITY            TREATMENT:   (In addition to Assessment/Re-Assessment sessions the following treatments were rendered)  Pre-treatment Symptoms/Complaints:  Pt states her R knee is doing well. Pain after a lot of activity, but ice continues to help  Pain: Initial:     1/10 R knee Post Session:  1/10 R knee     THERAPEUTIC EXERCISE: (30 minutes):  Exercises per grid below to improve mobility and strength. Required minimal visual, verbal and tactile cues to promote proper body alignment, promote proper body posture and promote proper body mechanics. Progressed resistance, range and repetitions as indicated.    Date  03-22-18   Activity/Exercise    Seated knee flexion with over pressure    Prone knee extension     Quad sets    SLR all 4 planes    Nustep    Standing Heel/Toe Raises    Knee Flexion Stretch on Step 10 reps  10 sec holds   TKE with T-band Black  20 reps   Gastroc Slantboard 3 reps  30 sec holds   Hamstring Stretch Manual   Heel Slides with Strap for OP    LAQ's    Airdyne 8 minutes   Nautilus Leg Press 35 Lbs  20 reps  R LE only   Step-Ups    Wall slide 10 reps  5 sec holds   Side step squat balance    Calf Raises on Leg Press Machine 85 Lbs  20 reps   Standing Balance Board    Nautilus Leg Curls 40 Lbs  20 reps   Stair Training     Manual: (15 minutes): mobilization to promote r knee flexion and extension, soft tissue hamstring, gastroc to improve muscle flexibility. Modalities: (Pt declined): pneumatic ice machine L knee. Skin intact following. [facility] [facility] Portal  Treatment/Session Assessment:    · Response to Treatment:  Pt tolerated treatments well. ROM and strength progressing well. · Compliance with Program/Exercises: compliant.   .  Total Treatment Duration:  45 minutes     Mikey Hinds, PT

## 2018-12-04 ENCOUNTER — HOSPITAL ENCOUNTER (OUTPATIENT)
Dept: MAMMOGRAPHY | Age: 48
Discharge: HOME | End: 2018-12-04
Attending: OBSTETRICS & GYNECOLOGY
Payer: COMMERCIAL

## 2018-12-04 DIAGNOSIS — Z12.39 SCREENING BREAST EXAMINATION: ICD-10-CM

## 2018-12-04 PROCEDURE — 77067 SCR MAMMO BI INCL CAD: CPT

## 2020-01-15 RX ORDER — LIDOCAINE HYDROCHLORIDE 20 MG/ML
15 SOLUTION OROPHARYNGEAL AS NEEDED
Status: CANCELLED | OUTPATIENT
Start: 2020-01-15

## 2020-02-04 ENCOUNTER — HOSPITAL ENCOUNTER (OUTPATIENT)
Dept: MAMMOGRAPHY | Age: 50
Discharge: HOME | End: 2020-02-04
Attending: OBSTETRICS & GYNECOLOGY
Payer: COMMERCIAL

## 2020-02-04 DIAGNOSIS — Z12.31 VISIT FOR SCREENING MAMMOGRAM: ICD-10-CM

## 2020-02-04 PROCEDURE — 77067 SCR MAMMO BI INCL CAD: CPT

## 2020-02-26 RX ORDER — LIDOCAINE HYDROCHLORIDE 20 MG/ML
15 SOLUTION OROPHARYNGEAL AS NEEDED
Status: CANCELLED | OUTPATIENT
Start: 2020-02-26

## 2020-03-03 ENCOUNTER — HOSPITAL ENCOUNTER (OUTPATIENT)
Age: 50
Setting detail: OUTPATIENT SURGERY
Discharge: HOME | End: 2020-03-03
Attending: INTERNAL MEDICINE | Admitting: INTERNAL MEDICINE
Payer: COMMERCIAL

## 2020-03-03 PROCEDURE — 74011000250 HC RX REV CODE- 250: Performed by: INTERNAL MEDICINE

## 2020-03-03 PROCEDURE — 91010 ESOPHAGUS MOTILITY STUDY: CPT | Performed by: INTERNAL MEDICINE

## 2020-03-03 PROCEDURE — 77030031717 HC CATH PRB COMFORTEC DISP SAND -B: Performed by: INTERNAL MEDICINE

## 2020-03-03 PROCEDURE — 91034 GASTROESOPHAGEAL REFLUX TEST: CPT | Performed by: INTERNAL MEDICINE

## 2020-03-03 RX ORDER — LIDOCAINE HYDROCHLORIDE 20 MG/ML
15 SOLUTION OROPHARYNGEAL AS NEEDED
Status: DISCONTINUED | OUTPATIENT
Start: 2020-03-03 | End: 2020-03-03 | Stop reason: HOSPADM

## 2020-03-03 RX ADMIN — LIDOCAINE HYDROCHLORIDE 15 ML: 20 SOLUTION ORAL; TOPICAL at 09:02

## 2020-03-03 NOTE — PROGRESS NOTES
Pt tolerated Manometry probe insertion at 45 cm. Test performed w/o issue. Pt tolerated pH probe insertion at 41 cm, gastric pH 1.8. Instructions given to patient and recording device explained. Pt instructed to return tomorrow after 3/4/20 at 9am for probe removal and bring paperwork with. Pt d/c in personal car.

## 2020-03-04 ENCOUNTER — HOSPITAL ENCOUNTER (OUTPATIENT)
Age: 50
Setting detail: OUTPATIENT SURGERY
Discharge: HOME | End: 2020-03-04
Attending: INTERNAL MEDICINE | Admitting: INTERNAL MEDICINE
Payer: COMMERCIAL

## 2020-03-04 PROCEDURE — 99211 OFF/OP EST MAY X REQ PHY/QHP: CPT | Performed by: INTERNAL MEDICINE

## 2020-03-04 NOTE — PROGRESS NOTES
Pt presented to endoscopy for pH probe removal.  Probe removed without difficulty by Antonio Pagan. Pt tolerated well. Test to be sent to Dr Freya Kauffman to be read.

## 2020-03-06 NOTE — PROCEDURES
[facility]  PROCEDURE NOTE    Name:  Noe Chery  MR#:  [id number]  :  1970  ACCOUNT #:  [de-identified]  DATE OF SERVICE:  2020    PREOPERATIVE DIAGNOSIS:  Gastroesophageal reflux disease. POSTOPERATIVE DIAGNOSES:  1.  No abnormal acid reflux with total acidification time of 0.8% and DeMeester score of 2.9.  2.  No abnormal impedance with total 32 episodes, normal less than 48.  3.  Symptom index correlating impedance episodes with symptoms was 28.6% for abdominal pain, 44.8% for belching, 42.9% for heartburn, and the symptom associated probability respectively at 71, 196%. PROCEDURE PERFORMED:  A 24-hour pH impedance monitoring (on acid suppression). SURGEON:  Tosha Loza MD    PROCEDURE:  After obtaining informed consent, the patient underwent manometry followed by pH impedance probe placement. The duration of the procedure was 21 hours 56 minutes of which 7 hours and 25 minutes was recumbent, 14 hours 31 minutes was upright. During this time, the patient had 15 upright and 0 recumbent acid reflux episodes. The total acidification time was 0.8%, well within normal limits. DeMeester score was calculated to be 2.9 and normal is less than 14.7. Again, no evidence of abnormal acid reflux. Impedance data with the patient on acid suppression should be less than 48 episodes and indeed it was 32.  32 upright, 0 recumbent; 21 acidic and 11 non-acidic. The patient had impedance related symptom correlation of 2/7 abdominal pain for 28.6%, 13/29 for belching for 44.8%, and 3/7 for heartburn for 42.9%. The symptom association probability was positive only for belches which is typical, in that the definition of belch includes impedance reflux. Heartburn was only 96% for symptom association and abdominal pain 71%. DISPOSITION:  Further followup per Dr. Pan Henley.       Franki Almaraz MD      [email or web address]  D:  2020 15:15  T:  2020 0:15  JOB #:  [id number]

## 2020-03-06 NOTE — PROCEDURES
[facility]  PROCEDURE NOTE    Name:  Trupti Oliveira  MR#:  [id number]  :  1970  ACCOUNT #:  [de-identified]  DATE OF SERVICE:  2020      PROCEDURE PERFORMED:  High-resolution impedance manometry. PREOPERATIVE DIAGNOSIS:  Gastroesophageal reflux disease. POSTOPERATIVE DIAGNOSIS:  Normal motility - see details below. PROCEDURE:  After obtaining informed consent, the patient underwent nasal intubation. The lower esophageal sphincter was located between 46.1 and 50.5 cm from the nares. The LES pressure was not calculated but was estimated by manual means and conventional manometry to be about 20 mmHg and the IRP was 15. These were within normal limits. The patient had a normal average DCI of 2457, 80% had normal metrics. 10% were ineffective and 10% hypercontractile, meaning DCI was over 8000. The impedance data showed 60% of liquid and 100% of viscous swallows with complete transit. IMPRESSION:  Overall, this appears normal.  There was some noise generated which precluded a computer analysis of the LES, but by manual analysis of conventional manometry, that appears normal and the impedance data suggest that there is no outlet obstruction. The motility was normal overall. DISPOSITION:  Further followup with Dr. Camille Bustillo.       Wojciech Hernandez MD      [email or web address]  D:  2020 14:58  T:  2020 2:44  JOB #:  [id number]  CC:  MD Vito Ruth MD

## 2020-03-11 NOTE — PROCEDURES
[facility]  PROCEDURE NOTE    Name:  Roland Orosco  MR#:  [id number]  :  1970  ACCOUNT #:  [de-identified]  DATE OF SERVICE:  2020      PROCEDURE PERFORMED:  24-hour pH impedance monitoring. PREOPERATIVE DIAGNOSIS:  Acid reflux. POSTOPERATIVE DIAGNOSIS:  1. This procedure was performed on acid suppression medication. 2.  No abnormal acid reflux with total acidification time of 0.8%. 3.  Normal DeMeester score of 2.9.  4.  No abnormal impedance with total 32 episodes. 5.  Positive symptom-associated probability of belch and borderline for heartburn, negative for abdominal pain - see below. PROCEDURE:  After obtaining informed consent, the patient underwent nasal intubation with the impedance manometry probe. The duration was 21 hours 56 minutes total with upright, 14 hours 31 minutes and supine 7 hours 25 minutes. During this time, the patient had 15 upright and 0 recumbent reflux episodes. The total acidification time was only 0.8% and therefore, the DeMeester score was not elevated. There was no significant acid reflux. Impedance data revealed 32 total episodes. Normal is less than 48.  21 were acidic, 11 were nonacidic. 32 were upright and none were supine. The patient had a lot of symptoms. 29 episodes of belching with a symptom index of 44.8%. 7 episodes of pain and 7 episodes of heartburn with index respectively 28.6 and 42.9%. The associated probability was not significant for abdominal pain, only 71%. It was significant for belch, 100% and borderline for heartburn with 96%. DISPOSITION:  Further followup per Dr. Frandy Da Silva.       Mireya Tobin MD      [email or web address]  D:  03/10/2020 14:04  T:  2020 1:23  JOB #:  [id number]  CC:  Sulaiman Fernandez MD

## 2020-06-01 ENCOUNTER — HOME HEALTH ADMISSION (OUTPATIENT)
Dept: HOME HEALTH SERVICES | Facility: HOME HEALTH | Age: 50
End: 2020-06-01
Payer: COMMERCIAL

## 2020-06-03 ENCOUNTER — HOME CARE VISIT (OUTPATIENT)
Dept: SCHEDULING | Facility: HOME HEALTH | Age: 50
End: 2020-06-03
Payer: COMMERCIAL

## 2020-06-03 VITALS
DIASTOLIC BLOOD PRESSURE: 62 MMHG | RESPIRATION RATE: 16 BRPM | TEMPERATURE: 99.1 F | SYSTOLIC BLOOD PRESSURE: 115 MMHG | HEART RATE: 75 BPM

## 2020-06-03 PROCEDURE — 400013 HH SOC

## 2020-06-03 PROCEDURE — G0151 HHCP-SERV OF PT,EA 15 MIN: HCPCS

## 2020-06-05 ENCOUNTER — HOME CARE VISIT (OUTPATIENT)
Dept: SCHEDULING | Facility: HOME HEALTH | Age: 50
End: 2020-06-05
Payer: COMMERCIAL

## 2020-06-05 VITALS
TEMPERATURE: 98.2 F | RESPIRATION RATE: 16 BRPM | HEART RATE: 75 BPM | DIASTOLIC BLOOD PRESSURE: 82 MMHG | SYSTOLIC BLOOD PRESSURE: 120 MMHG

## 2020-06-05 PROCEDURE — G0157 HHC PT ASSISTANT EA 15: HCPCS

## 2020-06-08 ENCOUNTER — HOME CARE VISIT (OUTPATIENT)
Dept: SCHEDULING | Facility: HOME HEALTH | Age: 50
End: 2020-06-08
Payer: COMMERCIAL

## 2020-06-08 VITALS
SYSTOLIC BLOOD PRESSURE: 125 MMHG | RESPIRATION RATE: 18 BRPM | HEART RATE: 76 BPM | TEMPERATURE: 99.6 F | DIASTOLIC BLOOD PRESSURE: 82 MMHG

## 2020-06-08 PROCEDURE — G0157 HHC PT ASSISTANT EA 15: HCPCS

## 2020-06-10 ENCOUNTER — HOME CARE VISIT (OUTPATIENT)
Dept: SCHEDULING | Facility: HOME HEALTH | Age: 50
End: 2020-06-10
Payer: COMMERCIAL

## 2020-06-10 VITALS
RESPIRATION RATE: 18 BRPM | HEART RATE: 87 BPM | DIASTOLIC BLOOD PRESSURE: 88 MMHG | OXYGEN SATURATION: 97 % | SYSTOLIC BLOOD PRESSURE: 120 MMHG | TEMPERATURE: 98.7 F

## 2020-06-10 PROCEDURE — G0157 HHC PT ASSISTANT EA 15: HCPCS

## 2020-06-12 ENCOUNTER — HOME CARE VISIT (OUTPATIENT)
Dept: SCHEDULING | Facility: HOME HEALTH | Age: 50
End: 2020-06-12
Payer: COMMERCIAL

## 2020-06-12 VITALS
TEMPERATURE: 98.4 F | SYSTOLIC BLOOD PRESSURE: 118 MMHG | HEART RATE: 78 BPM | OXYGEN SATURATION: 98 % | DIASTOLIC BLOOD PRESSURE: 78 MMHG | RESPIRATION RATE: 17 BRPM

## 2020-06-12 PROCEDURE — G0157 HHC PT ASSISTANT EA 15: HCPCS

## 2020-06-15 ENCOUNTER — HOME CARE VISIT (OUTPATIENT)
Dept: SCHEDULING | Facility: HOME HEALTH | Age: 50
End: 2020-06-15
Payer: COMMERCIAL

## 2020-06-15 VITALS
TEMPERATURE: 98.8 F | HEART RATE: 78 BPM | DIASTOLIC BLOOD PRESSURE: 60 MMHG | RESPIRATION RATE: 16 BRPM | SYSTOLIC BLOOD PRESSURE: 102 MMHG | OXYGEN SATURATION: 97 %

## 2020-06-15 PROCEDURE — G0151 HHCP-SERV OF PT,EA 15 MIN: HCPCS

## 2020-06-17 ENCOUNTER — HOME CARE VISIT (OUTPATIENT)
Dept: SCHEDULING | Facility: HOME HEALTH | Age: 50
End: 2020-06-17
Payer: COMMERCIAL

## 2020-06-17 VITALS
OXYGEN SATURATION: 96 % | RESPIRATION RATE: 15 BRPM | DIASTOLIC BLOOD PRESSURE: 70 MMHG | SYSTOLIC BLOOD PRESSURE: 110 MMHG | TEMPERATURE: 97.7 F | HEART RATE: 86 BPM

## 2020-06-17 PROCEDURE — G0151 HHCP-SERV OF PT,EA 15 MIN: HCPCS

## 2020-06-18 ENCOUNTER — HOME CARE VISIT (OUTPATIENT)
Dept: HOME HEALTH SERVICES | Facility: HOME HEALTH | Age: 50
End: 2020-06-18
Payer: COMMERCIAL

## 2020-06-18 VITALS
HEART RATE: 76 BPM | OXYGEN SATURATION: 96 % | SYSTOLIC BLOOD PRESSURE: 110 MMHG | DIASTOLIC BLOOD PRESSURE: 70 MMHG | RESPIRATION RATE: 18 BRPM | TEMPERATURE: 99 F

## 2020-06-18 PROCEDURE — A4649 SURGICAL SUPPLIES: HCPCS

## 2020-06-18 PROCEDURE — G0151 HHCP-SERV OF PT,EA 15 MIN: HCPCS

## 2020-06-24 ENCOUNTER — HOSPITAL ENCOUNTER (OUTPATIENT)
Dept: PHYSICAL THERAPY | Age: 50
Discharge: HOME | End: 2020-06-24
Payer: COMMERCIAL

## 2020-06-24 PROCEDURE — 97110 THERAPEUTIC EXERCISES: CPT

## 2020-06-24 PROCEDURE — 97162 PT EVAL MOD COMPLEX 30 MIN: CPT

## 2020-06-24 NOTE — PROGRESS NOTES
Vivian Mcdonough  : 1970  Primary: [facility]*  Secondary:  Therapy Center at [address] [place] [address], [address], [address] [facility]  [phone]   [phone]         OUTPATIENT PHYSICAL THERAPY: Daily Treatment Note 2020  Visit Count:  1    ICD-10: Treatment Diagnosis: Pain in left knee (M25.562); Difficulty in walking, not elsewhere classified (R26.2)  Precautions/Allergies:   Patient has no known allergies. TREATMENT PLAN:  Effective Dates: 2020 TO 2020 (90 days). Frequency/Duration: 2 times a week for 90 Day(s)    Pre-treatment Symptoms/Complaints:  L Knee Pain  Pain: Initial:   3-4/10 Post Session:  3/10   Medications Last Reviewed:  2020  Updated Objective Findings:  See evaluation note from today  TREATMENT:     THERAPEUTIC EXERCISE: (20 minutes):  Exercises per grid below to improve mobility and strength. Required minimal verbal cues to promote proper body alignment and promote proper body posture. Progressed resistance and repetitions as indicated. MODALITIES: (10 minutes):      Game Ready cold pack to L knee x10 minutes to decrease pain and swelling. Skin clear afterwards. Date:  20 Date:   Date:     Activity/Exercise Parameters Parameters Parameters   NuStep Level 2  6 minutes     Quad Sets 20 reps  5 sec holds  And with Manual OP     SLR Flexion 20 reps     SAQ's 20 reps  5 sec holds     Heel Slides with Strap for OP 15 reps  5 sec holds     Gastroc Stretch with Strap 3 reps  30 sec holds     Hamstring Stretch 3 reps  30 sec holds         Med[employer] Portal  Treatment/Session Summary:    · Response to Treatment:  Pt tolerated all treatments well today with no c/o. · Communication/Consultation:  None today  · Equipment provided today:  None today  · Recommendations/Intent for next treatment session: Next visit will focus on progression of ROM/strengthening exercises as tolerable.     Total Treatment Billable Duration:  20 minutes  PT Patient Time In/Time Out  Time In: 1400  Time Out: [address], PT    Future Appointments   Date Time Provider Dusty Karimi   6/26/2020  1:45 PM Mckayla Shows, PT [facility]   6/30/2020  1:45 PM Mckayla Shows, PT [facility]   7/2/2020  1:45 PM Mckayla Shows, PT [facility]   7/6/2020  1:45 PM Mckayla Shows, PT [facility]   7/8/2020  1:45 PM Mckayla Shows, PT [facility]   7/10/2020  2:30 PM Dheeraj Catching, PT [facility]   7/14/2020  1:45 PM Mckayla Shows, PT [facility]   7/16/2020  1:45 PM Mckayla Shows, PT [facility]   7/21/2020  9:30 AM Dheeraj Catching, PT [facility]   7/23/2020  1:00 PM Dheeraj Catching, PT [facility]   7/28/2020  1:45 PM Mckayla Shows, PT [facility]   7/30/2020  1:45 PM Mckayla Shows, PT [facility] S[facility]   8/10/2020  9:10 AM Harmeet Dyson MD [place] [facility]

## 2020-06-24 NOTE — THERAPY EVALUATION
Coral Goemz  : 1970  Primary: [facility]  Secondary:  Therapy Center at [address], [address], [place] [address].  [phone]   [phone]           OUTPATIENT PHYSICAL THERAPY:Initial Assessment 2020   ICD-10: Treatment Diagnosis: Pain in left knee (M25.562); Difficulty in walking, not elsewhere classified (R26.2)  Precautions/Allergies:   Patient has no known allergies. TREATMENT PLAN:  Effective Dates: 2020 TO 2020 (90 days). Frequency/Duration: 2 times a week for 90 Day(s) MEDICAL/REFERRING DIAGNOSIS:  knee   DATE OF ONSET: Surgery 20  REFERRING PHYSICIAN: Evelia Kirkland MD MD Orders: Evaluate and Treat  Return MD Appointment: 20     INITIAL ASSESSMENT:  Ms. Karen Royal presents with decreased L knee ROM, decreased L knee strength and increased pain leading to decreased functional status. Pt would benefit from skilled physical therapy services to address the above deficits and help patient return to prior level of function. PROBLEM LIST (Impacting functional limitations):  1. Decreased Strength  2. Decreased ADL/Functional Activities  3. Increased Pain  4. Decreased Flexibility/Joint Mobility  5. Decreased Fries with Home Exercise Program INTERVENTIONS PLANNED: (Treatment may consist of any combination of the following)  1. Balance Exercise  2. Cold  3. Cryotherapy  4. Electrical Stimulation  5. Gait Training  6. Home Exercise Program (HEP)  7. Manual Therapy  8. Range of Motion (ROM)  9. Therapeutic Exercise/Strengthening     GOALS: (Goals have been discussed and agreed upon with patient.)  Short-Term Functional Goals: Time Frame: 4 weeks  1. Pt will increase ROM L knee 0-120 degrees to assist with ascending/descending stairs and household ADL's  2. Pt will increase strength L knee 4-/5 to assist with ascending/descending stairs and household ADL's  3.  Pt will be independent with HEP  Discharge Goals: Time Frame: 12 weeks  1. Pt will increase strength L knee 4+/5 to assist with ascending/descending stairs and household ADL's  2. Pt will ascend/descend 10 eight inch steps with bilateral handrails using a reciprocal pattern independently with min to no c/o L knee pain  3. Pt will perform 20 minutes household cleaning activities independently with min to no c/o L knee pain    OUTCOME MEASURE:   Tool Used: Lower Extremity Functional Scale (LEFS)  Score:  Initial: 24/80 Most Recent: X/80 (Date: -- )   Interpretation of Score: 20 questions each scored on a 5 point scale with 0 representing \"extreme difficulty or unable to perform\" and 4 representing \"no difficulty\". The lower the score, the greater the functional disability. 80/80 represents no disability. Minimal detectable change is 9 points. MEDICAL NECESSITY:   · Patient is expected to demonstrate progress in strength, range of motion and functional technique to increase independence with ascending/descending stairs and household ADL's. · Patient demonstrates good rehab potential due to higher previous functional level. REASON FOR SERVICES/OTHER COMMENTS:  · Patient continues to require skilled intervention due to decreased ROM/strength L kene with increased pain leading to decreased functional status. Total Duration:  PT Patient Time In/Time Out  Time In: 1400  Time Out: 1445    Rehabilitation Potential For Stated Goals: Good  Regarding Anup Montejo's therapy, I certify that the treatment plan above will be carried out by a therapist or under their direction. Thank you for this referral,  Jorge Delgado PT     Referring Physician Signature: Anuel Bunch MD _______________________________ Date _____________     PAIN/SUBJECTIVE:   Initial:   3-4/10 Post Session:  3/10   HISTORY:   History of Injury/Illness (Reason for Referral):  Pt reports bilateral knee pain of greater than 10 years duration.   She had R TKA 01-10-18. She then had L TKA 20. She spent 1 night in hospital followed by home health PT until last Thursday. She saw MD today and he said everything was looking good. She rates her current L knee pain 3-410. Pt is taking Tramadol at night. Pt is sleeping in the bed. Pt reports difficulties sleeping at night due to L knee pain. Pt stopped using her walker about 10 days post-op. Pt drove to PT today and states she did fine. Past Medical History/Comorbidities:   Ms. Enmanuel Francois  has a past medical history of Allergic rhinitis, Arthritis, GERD (gastroesophageal reflux disease), and Laryngeal ulceration. Ms. Enmanuel Francois  has a past surgical history that includes hx  section (); hx abdominal wall defect repair; implant breast silicone/eq (Bilateral, 2010); and implant breast silicone/eq (Bilateral, 2018). Social History/Living Environment:     Pt lives with  and 4 children in a one story house with 3 steps to enter. She is currently having difficulties ascending/descending stairs due to her L knee pain  Prior Level of Function/Work/Activity:  Pt currently working from home   Dominant Side:         RIGHT  Other Clinical Tests:          N/A  Personal Factors:          Sex:  female        Age:  47 y.o. Profession:  Pt works part time for [employer]    Ambulatory/Rehab dough Risk Assessment   Risk Factors:       No Risk Factors Identified Ability to Rise from Chair:       (1)  Pushes up, successful in one attempt   Falls Prevention Plan:       No modifications necessary   Total: (5 or greater = High Risk): 1    [facility] [address] [place] [address] States Patent #[id number].  Federal Law prohibits the replication, distribution or use without written permission from [facility] [employer]   Current Medications:       Current Outpatient Medications:     iron ps complex/B12/folic acid (FERREX 733 FORTE PO), Take 150 mg by mouth two (2) times a day., Disp: , Rfl:     ascorbic acid, vitamin C, (VITAMIN C) 500 mg tablet, Take 500 mg by mouth two (2) times a day., Disp: , Rfl:     acetaminophen (TYLENOL) 500 mg tablet, Take 1,000 mg by mouth every eight (8) hours as needed for Pain., Disp: , Rfl:     folic acid (FOLVITE) 1 mg tablet, Take 1 mg by mouth daily. , Disp: , Rfl:     oxyCODONE (OXYIR) 5 mg capsule, Take 5 mg by mouth every four (4) hours as needed for Pain., Disp: , Rfl:     aspirin delayed-release 81 mg tablet, Take 81 mg by mouth two (2) times a day., Disp: , Rfl:     traMADoL (ULTRAM) 50 mg tablet, Take 50 mg by mouth every six (6) hours as needed for Pain. Take 1-2 tabs every 6 hours as needed for pain, Disp: , Rfl:     senna (Senna Lax) 8.6 mg tablet, Take 1 Tab by mouth daily. , Disp: , Rfl:     polyethylene glycol (MIRALAX) 17 gram packet, Take 17 g by mouth daily. Mix with 8oz of water, Disp: , Rfl:     butalbital-acetaminophen-caffeine (FIORICET, ESGIC) -40 mg per tablet, Take 1 Tab by mouth every six (6) hours as needed for Headache., Disp: , Rfl:     omeprazole (PRILOSEC) 40 mg capsule, Take 1 Cap by mouth two (2) times a day. (Patient taking differently: Take 1 Cap by mouth daily.), Disp: 180 Cap, Rfl: 3    diclofenac-misoprostol (ARTHROTEC 50)  mg-mcg per tablet, Take 1 Tab by mouth two (2) times a day. Indications: OSTEOARTHRITIS IN PATIENT AT HIGH GASTRIC ULCER RISK, Disp: 60 Tab, Rfl: 5    diphenhydrAMINE (BENADRYL ALLERGY) 25 mg tablet, Take 25 mg by mouth every six (6) hours as needed for Sleep., Disp: , Rfl:    Date Last Reviewed:  06-24-20   Number of Personal Factors/Comorbidities that affect the Plan of Care: 1-2: MODERATE COMPLEXITY   EXAMINATION:   Observation/Orthostatic Postural Assessment:           Forward head, rounded shoulders  Palpation:          Generalized tenderness L knee  ROM:    R knee extension = 0 degrees  R knee flexion = 125 degrees    L knee extension = -15 degrees, -8 degrees post-exercise  L knee flexion = 93 degrees, 105 degrees post-exercise    Strength:    R knee extension = 5/5  R knee flexion = 5/5    L knee extension = 3+/5  L knee flexion = 3+/5    Special Tests:          N/A  Neurological Screen:        Sensation: Diminished sensation lateral L knee  Functional Mobility:         Gait/Ambulation:  Pt walking with moderate antalgic gait and no assistive device        Transfers:  Pt able to transition sit to supine and supine to sit independently    Body Structures Involved:  1. Bones  2. Joints  3. Muscles Body Functions Affected:  1. Sensory/Pain  2. Neuromusculoskeletal Activities and Participation Affected:  1. Mobility  2.  Domestic Life   Number of elements (examined above) that affect the Plan of Care: 3: MODERATE COMPLEXITY   CLINICAL PRESENTATION:   Presentation: Evolving clinical presentation with changing clinical characteristics: MODERATE COMPLEXITY   CLINICAL DECISION MAKING:   Use of outcome tool(s) and clinical judgement create a POC that gives a: Questionable prediction of patient's progress: MODERATE COMPLEXITY

## 2020-06-26 ENCOUNTER — HOSPITAL ENCOUNTER (OUTPATIENT)
Dept: PHYSICAL THERAPY | Age: 50
Discharge: HOME | End: 2020-06-26
Payer: COMMERCIAL

## 2020-06-26 PROCEDURE — A4649 SURGICAL SUPPLIES: HCPCS

## 2020-06-26 PROCEDURE — 97110 THERAPEUTIC EXERCISES: CPT

## 2020-06-26 NOTE — PROGRESS NOTES
Vivian Mcdonough  : 1970  Primary: [facility]  Secondary:  Therapy Center at [place] [address] [facility], [address], [address] [facility]  [phone]   [phone]         OUTPATIENT PHYSICAL THERAPY: Daily Treatment Note 2020  Visit Count:  2    ICD-10: Treatment Diagnosis: Pain in left knee (M25.562); Difficulty in walking, not elsewhere classified (R26.2)  Precautions/Allergies:   Patient has no known allergies. TREATMENT PLAN:  Effective Dates: 2020 TO 2020 (90 days). Frequency/Duration: 2 times a week for 90 Day(s)    Pre-treatment Symptoms/Complaints:  L Knee Pain; Pt states her knee is doing pretty good today. Pain: Initial:   3-4/10 Post Session:  3/10   Medications Last Reviewed:  2020  Updated Objective Findings:  L knee flexion = 118 degrees  TREATMENT:     THERAPEUTIC EXERCISE: (45 minutes):  Exercises per grid below to improve mobility and strength. Required minimal verbal cues to promote proper body alignment and promote proper body posture. Progressed resistance and repetitions as indicated. MODALITIES: (0 minutes):      Game Ready cold pack to L knee x10 minutes to decrease pain and swelling. Skin clear afterwards.       Date:  20 Date:  20 Date:     Activity/Exercise Parameters Parameters Parameters   NuStep Level 2  6 minutes Level 3  6 minutes    Quad Sets 20 reps  5 sec holds  And with Manual OP 20 reps  5 sec holds  And with manual OP    SLR Flexion 20 reps 20 reps    SAQ's 20 reps  5 sec holds 20 reps  5 sec holds    Heel Slides with Strap for OP 15 reps  5 sec holds 20 reps  5 sec holds    Gastroc Stretch with Strap 3 reps  30 sec holds On Slantboard  3 reps  30 sec holds    Hamstring Stretch 3 reps  30 sec holds     Standing Heel/Toe Raises  20 reps each    Step-Ups  4 inch step  20 reps    TKE with T-band  Green T-band  20 reps    Standing Knee Flexion Stretch on Step  10 reps  10 sec holds Seated Knee Flexion Stretch  10 reps  10 sec holds        MedBridge Portal  Treatment/Session Summary:    · Response to Treatment:  Pt tolerated all treatments well today with no c/o. Increased ROM L knee today. Pt declined ice at end of session stating she would ice knee at home. · Communication/Consultation:  None today  · Equipment provided today:  None today  · Recommendations/Intent for next treatment session: Next visit will focus on progression of ROM/strengthening exercises as tolerable.     Total Treatment Billable Duration:  45 minutes  PT Patient Time In/Time Out  Time In: 5662  Time Out: [facility], PT    Future Appointments   Date Time Provider Dusty Karimi   6/30/2020  1:45 PM Wellman Lulas, PT [facility]   7/2/2020  1:45 PM Wellman Lulas, PT [facility]   7/6/2020  1:45 PM Wellman Lulas, PT [facility]   7/8/2020  1:45 PM Wellman Lulas, PT [facility]   7/10/2020  2:30 PM Gordan Bar, PT [facility]   7/14/2020  1:45 PM Wellman Lulas, PT [facility]   7/16/2020  1:45 PM Wellman Lulas, PT [facility]   7/21/2020  9:30 AM Gordan Bar, PT [facility]   7/23/2020  1:00 PM Gordan Bar, PT [facility]   7/28/2020  1:45 PM Wellman Lulas, PT [facility]   7/30/2020  1:45 PM Wellman Lulas, PT [facility]   8/10/2020  9:10 AM Arden Dyson MD [place] [facility]

## 2020-06-30 ENCOUNTER — HOSPITAL ENCOUNTER (OUTPATIENT)
Dept: PHYSICAL THERAPY | Age: 50
Discharge: HOME | End: 2020-06-30
Payer: COMMERCIAL

## 2020-06-30 PROCEDURE — 97110 THERAPEUTIC EXERCISES: CPT

## 2020-06-30 PROCEDURE — 97140 MANUAL THERAPY 1/> REGIONS: CPT

## 2020-06-30 NOTE — PROGRESS NOTES
John Dumont  : 1970  Primary: [facility]  Secondary:  Therapy Center at [place] [address] [facility], [address], [place] [address].  [phone]   [phone]         OUTPATIENT PHYSICAL THERAPY: Daily Treatment Note 2020  Visit Count:  3    ICD-10: Treatment Diagnosis: Pain in left knee (M25.562); Difficulty in walking, not elsewhere classified (R26.2)  Precautions/Allergies:   Patient has no known allergies. TREATMENT PLAN:  Effective Dates: 2020 TO 2020 (90 days). Frequency/Duration: 2 times a week for 90 Day(s)    Pre-treatment Symptoms/Complaints:  L Knee Pain; Pt states her knee is doing pretty well today. Pain: Initial:   3-4/10 Post Session:  3/10   Medications Last Reviewed:  2020  Updated Objective Findings:  PROM L knee 0-120 degrees  TREATMENT:     THERAPEUTIC EXERCISE: (35 minutes):  Exercises per grid below to improve mobility and strength. Required minimal verbal cues to promote proper body alignment and promote proper body posture. Progressed resistance and repetitions as indicated. MANUAL THERAPY: (10 minutes): PROM with OP into L knee flexion and extension; soft tissue work through L gastroc/soleus in prone  MODALITIES: (10 minutes):      Game Ready cold pack to L knee x10 minutes to decrease pain and swelling. Skin clear afterwards.       Date:  20 Date:  20 Date:  20   Activity/Exercise Parameters Parameters Parameters   NuStep Level 2  6 minutes Level 3  6 minutes Level 4  6 minutes   Quad Sets 20 reps  5 sec holds  And with Manual OP 20 reps  5 sec holds  And with manual OP 20 reps  5 sec holds  And with manual OP   SLR Flexion 20 reps 20 reps    SAQ's 20 reps  5 sec holds 20 reps  5 sec holds    Heel Slides with Strap for OP 15 reps  5 sec holds 20 reps  5 sec holds 20 reps  5 sec holds   Gastroc Stretch with Strap 3 reps  30 sec holds On Slantboard  3 reps  30 sec holds On Slantboard  3 reps  30 sec holds   Hamstring Stretch 3 reps  30 sec holds     Standing Heel/Toe Raises  20 reps each 20 reps each   Step-Ups  4 inch step  20 reps 6 inch  20 reps   TKE with T-band  Green T-band  20 reps Blue T-band  20 reps   Standing Knee Flexion Stretch on Step  10 reps  10 sec holds 10 reps  10 reps   Seated Knee Flexion Stretch  10 reps  10 sec holds        MedBridge Portal  Treatment/Session Summary:    · Response to Treatment:  Pt tolerated all treatments well today with no c/o. ROM/strength continue to improve L knee. · Communication/Consultation:  None today  · Equipment provided today:  None today  · Recommendations/Intent for next treatment session: Next visit will focus on progression of ROM/strengthening exercises as tolerable.     Total Treatment Billable Duration:  45 minutes  PT Patient Time In/Time Out  Time In: 3301  Time Out: 1440  Nusratck Tomasz, PT    Future Appointments   Date Time Provider Dusty Karimi   7/2/2020  1:45 PM Burma Bolton, PT [facility]   7/6/2020  1:45 PM Burma Bolton, PT [facility]   7/8/2020  1:45 PM Burma Bolton, PT [facility]   7/10/2020  2:30 PM Linda , PT [facility]   7/14/2020  1:45 PM Burma Bolton, PT [facility]   7/16/2020  1:45 PM Burma Bolton, PT [facility]   7/21/2020  9:30 AM Linda , PT [facility]   7/23/2020  1:00 PM Linda , PT [facility]   7/28/2020  1:45 PM Burma Bolton, PT [facility]   7/30/2020  1:45 PM Burma Bolton, PT [facility]   8/10/2020  9:10 AM Misael Dyson MD [place] [facility] [address] [facility] [address] [facility] [address] [facility]

## 2020-07-02 ENCOUNTER — HOSPITAL ENCOUNTER (OUTPATIENT)
Dept: PHYSICAL THERAPY | Age: 50
Discharge: HOME | End: 2020-07-02
Payer: COMMERCIAL

## 2020-07-02 PROCEDURE — 97110 THERAPEUTIC EXERCISES: CPT

## 2020-07-02 PROCEDURE — 97140 MANUAL THERAPY 1/> REGIONS: CPT

## 2020-07-02 NOTE — PROGRESS NOTES
Beryle Brady  : 1970  Primary: [facility]  Secondary:  Therapy Center at [place] [address] [facility], [address], [place] [address].  [phone]   [phone]         OUTPATIENT PHYSICAL THERAPY: Daily Treatment Note 2020  Visit Count:  4    ICD-10: Treatment Diagnosis: Pain in left knee (M25.562); Difficulty in walking, not elsewhere classified (R26.2)  Precautions/Allergies:   Patient has no known allergies. TREATMENT PLAN:  Effective Dates: 2020 TO 2020 (90 days). Frequency/Duration: 2 times a week for 90 Day(s)    Pre-treatment Symptoms/Complaints:  L Knee Pain; Pt states her knee is doing pretty well today. Pain: Initial:   3-4/10 Post Session:  3/10   Medications Last Reviewed:  2020  Updated Objective Findings:  PROM L knee 0-124 degrees  TREATMENT:     THERAPEUTIC EXERCISE: (35 minutes):  Exercises per grid below to improve mobility and strength. Required minimal verbal cues to promote proper body alignment and promote proper body posture. Progressed resistance and repetitions as indicated. MANUAL THERAPY: (10 minutes): PROM with OP into L knee flexion and extension; soft tissue work through L gastroc/soleus in prone; soft tissue work through [employer]; scar massage L knee  MODALITIES: (0 minutes):      Game Ready cold pack to L knee x10 minutes to decrease pain and swelling. Skin clear afterwards.       Date:  20 Date:  20 Date:  20 Date:  20   Activity/Exercise Parameters Parameters Parameters    NuStep Level 2  6 minutes Level 3  6 minutes Level 4  6 minutes Level 4  6 minutes   Quad Sets 20 reps  5 sec holds  And with Manual OP 20 reps  5 sec holds  And with manual OP 20 reps  5 sec holds  And with manual OP 20 reps  5 sec holds  And with manual OP   SLR Flexion 20 reps 20 reps  20 reps   SAQ's 20 reps  5 sec holds 20 reps  5 sec holds     Heel Slides with Strap for OP 15 reps  5 sec holds 20 reps  5 sec holds 20 reps  5 sec holds 20 reps  5 sec holds   Gastroc Stretch with Strap 3 reps  30 sec holds On Slantboard  3 reps  30 sec holds On Slantboard  3 reps  30 sec holds On Slantboard  3 reps  30 sec holds   Hamstring Stretch 3 reps  30 sec holds      Standing Heel/Toe Raises  20 reps each 20 reps each 20 reps each   Step-Ups  4 inch step  20 reps 6 inch  20 reps 6 inch step  20 reps   TKE with T-band  Green T-band  20 reps Blue T-band  20 reps Blue T-band  20 reps   Standing Knee Flexion Stretch on Step  10 reps  10 sec holds 10 reps  10 sec holds 10 reps  10 sec holds   Seated Knee Flexion Stretch  10 reps  10 sec holds  10 reps  10 sec holds         [employer] Portal  Treatment/Session Summary:    · Response to Treatment:  Pt tolerated all treatments well today with no c/o. ROM/strength continue to improve L knee. Pt declined ice at end of session. · Communication/Consultation:  None today  · Equipment provided today:  None today  · Recommendations/Intent for next treatment session: Next visit will focus on progression of ROM/strengthening exercises as tolerable.     Total Treatment Billable Duration:  45 minutes  PT Patient Time In/Time Out  Time In: 7860  Time Out: Jamey Platt PT    Future Appointments   Date Time Provider Dusty Karimi   7/6/2020  1:45 PM Tulio Tate, PT [facility] SFE   7/8/2020  1:45 PM Tulio Tate, PT [facility]   7/10/2020  2:30 PM Cherl Fennel, PT [facility]   7/14/2020  1:45 PM Tulio Lowing, PT [facility]   7/16/2020  1:45 PM Tulio Lowing, PT [facility]   7/21/2020  9:30 AM Cherl Fennel, PT [facility]   7/23/2020  1:00 PM Cherl Fennel, PT [facility]   7/28/2020  1:45 PM Tulio Lowing, PT [facility]   7/30/2020  1:45 PM Tulio Tate, PT [facility] S[facility]   8/10/2020  9:10 AM Della Dyson MD [place] [facility]

## 2020-07-06 ENCOUNTER — HOSPITAL ENCOUNTER (OUTPATIENT)
Dept: PHYSICAL THERAPY | Age: 50
Discharge: HOME | End: 2020-07-06
Payer: COMMERCIAL

## 2020-07-06 PROCEDURE — 97140 MANUAL THERAPY 1/> REGIONS: CPT

## 2020-07-06 PROCEDURE — 97110 THERAPEUTIC EXERCISES: CPT

## 2020-07-06 NOTE — PROGRESS NOTES
Roscoe Dejesus  : 1970  Primary: Vy Pham Of Manjeet Benavides*  Secondary:  [facility] [place] [address], [address], [address] [facility]  [phone]   [phone]         OUTPATIENT PHYSICAL THERAPY: Daily Treatment Note 2020  Visit Count:  5    ICD-10: Treatment Diagnosis: Pain in left knee (M25.562); Difficulty in walking, not elsewhere classified (R26.2)  Precautions/Allergies:   Patient has no known allergies. TREATMENT PLAN:  Effective Dates: 2020 TO 2020 (90 days). Frequency/Duration: 2 times a week for 90 Day(s)    Pre-treatment Symptoms/Complaints:  L Knee Pain; Pt states her knee is feeling good today. Pain: Initial:   3-4/10 Post Session:  3/10   Medications Last Reviewed:  2020  Updated Objective Findings:  PROM L knee 0-127 degrees  TREATMENT:     THERAPEUTIC EXERCISE: (35 minutes):  Exercises per grid below to improve mobility and strength. Required minimal verbal cues to promote proper body alignment and promote proper body posture. Progressed resistance and repetitions as indicated. MANUAL THERAPY: (10 minutes): PROM with OP into L knee flexion and extension; soft tissue work through L gastroc/soleus in prone; soft tissue work through [employer]; scar massage L knee  MODALITIES: (0 minutes):      Game Ready cold pack to L knee x10 minutes to decrease pain and swelling. Skin clear afterwards.       Date:  20 Date:  20 Date:  20 Date:  20 Date:  20   Activity/Exercise Parameters Parameters Parameters     NuStep Level 2  6 minutes Level 3  6 minutes Level 4  6 minutes Level 4  6 minutes    Quad Sets 20 reps  5 sec holds  And with Manual OP 20 reps  5 sec holds  And with manual OP 20 reps  5 sec holds  And with manual OP 20 reps  5 sec holds  And with manual OP 20 reps  5 sec holds  And with manual OP   SLR Flexion 20 reps 20 reps  20 reps    SAQ's 20 reps  5 sec holds 20 reps  5 sec holds Heel Slides with Strap for OP 15 reps  5 sec holds 20 reps  5 sec holds 20 reps  5 sec holds 20 reps  5 sec holds 20 reps  5 sec holds   Gastroc Stretch with Strap 3 reps  30 sec holds On Slantboard  3 reps  30 sec holds On Slantboard  3 reps  30 sec holds On Slantboard  3 reps  30 sec holds On Slantboard  3 reps  30 sec holds   Hamstring Stretch 3 reps  30 sec holds       Standing Heel/Toe Raises  20 reps each 20 reps each 20 reps each    Step-Ups  4 inch step  20 reps 6 inch  20 reps 6 inch step  20 reps 6 inch step  20 reps   TKE with T-band  Green T-band  20 reps Blue T-band  20 reps Blue T-band  20 reps Black T-band  20 reps   Standing Knee Flexion Stretch on Step  10 reps  10 sec holds 10 reps  10 sec holds 10 reps  10 sec holds 10 reps  10 sec holds   Seated Knee Flexion Stretch  10 reps  10 sec holds  10 reps  10 sec holds      Airdyne     6 minutes   Nautilus Leg Press     60 Lbs  20 reps   Nautilus Calf Raises on Leg Press Machine     35 Lbs  20 reps   Stair Training       3 steps  2 sets  Reciprocal pattern  Bilateral handrails       MedBridge Portal  Treatment/Session Summary:    · Response to Treatment:  Pt tolerated all treatments well today with no c/o. ROM improving L knee. · Communication/Consultation:  None today  · Equipment provided today:  None today  · Recommendations/Intent for next treatment session: Next visit will focus on progression of ROM/strengthening exercises as tolerable.     Total Treatment Billable Duration:  45 minutes  PT Patient Time In/Time Out  Time In: 9395  Time Out: [address] [facility], PT    Future Appointments   Date Time Provider Dusty Karimi   7/8/2020  1:45 PM Pan Gacria, PT [facility] SFE   7/10/2020  2:30 PM Elfredia December, PT [facility]   7/14/2020  1:45 PM Perla Freshwater, PT [facility]   7/16/2020  1:45 PM Perla Freshwater, PT [facility]   7/21/2020  9:30 AM Elfredia December, PT [facility]   7/23/2020  1:00 PM Elfredia December, PT [facility] 7/28/2020  1:45 PM Charles Brooks, PT [facility]   7/30/2020  1:45 PM Charles Brooks PT [facility] SFE   8/10/2020  9:10 AM Keerthi Dyson MD [place] [facility]

## 2020-07-08 ENCOUNTER — HOSPITAL ENCOUNTER (OUTPATIENT)
Dept: PHYSICAL THERAPY | Age: 50
Discharge: HOME | End: 2020-07-08
Payer: COMMERCIAL

## 2020-07-08 PROCEDURE — 97110 THERAPEUTIC EXERCISES: CPT

## 2020-07-08 PROCEDURE — 97140 MANUAL THERAPY 1/> REGIONS: CPT

## 2020-07-08 NOTE — PROGRESS NOTES
Verner Ke  : 1970  Primary: Rachael Solomon Of Manjeet Benavides*  Secondary:  [facility] [place] [address] [facility], [address], [address] [facility]  [phone]   [phone]         OUTPATIENT PHYSICAL THERAPY: Daily Treatment Note 2020  Visit Count:  6    ICD-10: Treatment Diagnosis: Pain in left knee (M25.562); Difficulty in walking, not elsewhere classified (R26.2)  Precautions/Allergies:   Patient has no known allergies. TREATMENT PLAN:  Effective Dates: 2020 TO 2020 (90 days). Frequency/Duration: 2 times a week for 90 Day(s)    Pre-treatment Symptoms/Complaints:  L Knee Pain; Pt states her knee felt really good following her last session, but she was sore in her thigh muscles the following day. Pain: Initial:   3-4/10 Post Session:  3/10   Medications Last Reviewed:  2020  Updated Objective Findings:  PROM L knee 0-128 degrees  TREATMENT:     THERAPEUTIC EXERCISE: (35 minutes):  Exercises per grid below to improve mobility and strength. Required minimal verbal cues to promote proper body alignment and promote proper body posture. Progressed resistance and repetitions as indicated. MANUAL THERAPY: (10 minutes): PROM with OP into L knee flexion and extension; soft tissue work through L gastroc/soleus in prone; soft tissue work through [employer]; scar massage L knee  MODALITIES: (10 minutes):      Game Ready cold pack to L knee x10 minutes to decrease pain and swelling. Skin clear afterwards.       Date:  20 Date:  20 Date:  20 Date:  20 Date:  20   Activity/Exercise Parameters Parameters      NuStep Level 3  6 minutes Level 4  6 minutes Level 4  6 minutes     Quad Sets 20 reps  5 sec holds  And with manual OP 20 reps  5 sec holds  And with manual OP 20 reps  5 sec holds  And with manual OP 20 reps  5 sec holds  And with manual OP 20 reps  5 sec holds  And with manual OP   SLR Flexion 20 reps  20 reps     SAQ's 20 reps  5 sec holds       Heel Slides with Strap for OP 20 reps  5 sec holds 20 reps  5 sec holds 20 reps  5 sec holds 20 reps  5 sec holds 20 reps  5 sec holds   Gastroc Stretch with Strap On Slantboard  3 reps  30 sec holds On Slantboard  3 reps  30 sec holds On Slantboard  3 reps  30 sec holds On Slantboard  3 reps  30 sec holds On Slantboard  3 reps  30 sec holds   Hamstring Stretch        Standing Heel/Toe Raises 20 reps each 20 reps each 20 reps each     Step-Ups 4 inch step  20 reps 6 inch  20 reps 6 inch step  20 reps 6 inch step  20 reps 4 inch step  20 reps  Working on ascending using L quad   TKE with T-band Green T-band  20 reps Blue T-band  20 reps Blue T-band  20 reps Black T-band  20 reps Black T-band  20 reps   Standing Knee Flexion Stretch on Step 10 reps  10 sec holds 10 reps  10 sec holds 10 reps  10 sec holds 10 reps  10 sec holds 10 reps  10 sec holds   Seated Knee Flexion Stretch 10 reps  10 sec holds  10 reps  10 sec holds       Airdyne    6 minutes 7 minutes   Nautilus Leg Press    60 Lbs  20 reps 60 Lbs  20 reps   Nautilus Calf Raises on Leg Press Machine    35 Lbs  20 reps 35 Lbs  20 reps   Stair Training      3 steps  2 sets  Reciprocal pattern  Bilateral handrails 3 steps  2 sets  Reciprocal pattern  Bilateral handrails       MedBridge Portal  Treatment/Session Summary:    · Response to Treatment:  Pt tolerated all treatments well today with no c/o. ROM and strength continue to improve L knee. · Communication/Consultation:  None today  · Equipment provided today:  None today  · Recommendations/Intent for next treatment session: Next visit will focus on progression of ROM/strengthening exercises as tolerable.     Total Treatment Billable Duration:  45 minutes  PT Patient Time In/Time Out  Time In: 1345  Time Out: [address], PT    Future Appointments   Date Time Provider Dusty Karimi   7/10/2020  2:30 PM Mikki Loza [facility] SFE   7/14/2020  1:45 PM Leopoldo Deters, PT [facility]   7/16/2020  1:45 PM Feliciano Simpson, PT [facility]   7/21/2020  9:30 AM Penny Barba, PT [facility]   7/23/2020  1:00 PM Penny Barba, PT [facility] SFE   7/28/2020  1:45 PM Feliciano Simpson, PT [facility] SFE   7/30/2020  1:45 PM Feliciano Simpson, PT [facility] SFE   8/10/2020  9:10 AM Rizwan Dyson MD [place] [facility]

## 2020-07-10 ENCOUNTER — HOSPITAL ENCOUNTER (OUTPATIENT)
Dept: PHYSICAL THERAPY | Age: 50
Discharge: HOME | End: 2020-07-10
Payer: COMMERCIAL

## 2020-07-10 PROCEDURE — 97110 THERAPEUTIC EXERCISES: CPT

## 2020-07-10 PROCEDURE — 97140 MANUAL THERAPY 1/> REGIONS: CPT

## 2020-07-10 NOTE — PROGRESS NOTES
Les Desir  : 1970  Primary: Angela Bryan Of Manjeet Benavides*  Secondary:  [facility] [place] [address], [address], [place] [address].  [phone]   [phone]         OUTPATIENT PHYSICAL THERAPY: Daily Treatment Note 7/10/2020  Visit Count:  7    ICD-10: Treatment Diagnosis: Pain in left knee (M25.562); Difficulty in walking, not elsewhere classified (R26.2)  Precautions/Allergies:   Patient has no known allergies. TREATMENT PLAN:  Effective Dates: 2020 TO 2020 (90 days). Frequency/Duration: 2 times a week for 90 Day(s)    Pre-treatment Symptoms/Complaints:  L Knee Pain; 7/10/2020: Patient reports she feels she is able to walk faster and without a limp. Pain: Initial:   3-4/10 Post Session:  3/10   Medications Last Reviewed:  7/10/2020  Updated Objective Findings:  PROM L knee 0-128 degrees  TREATMENT:     THERAPEUTIC EXERCISE: (35 minutes):  Exercises per grid below to improve mobility and strength. Required minimal verbal cues to promote proper body alignment and promote proper body posture. Progressed resistance and repetitions as indicated.    Date:  20 Date:  20 Date:  20 Date:  7/10/2020   Activity/Exercise    Parameters   NuStep Level 4  6 minutes      Quad Sets 20 reps  5 sec holds  And with manual OP 20 reps  5 sec holds  And with manual OP 20 reps  5 sec holds  And with manual OP 20 reps  5 second hold with manual overpressure   SLR Flexion 20 reps      Heel Slides with Strap for OP 20 reps  5 sec holds 20 reps  5 sec holds 20 reps  5 sec holds 20 reps  5 second hold   Gastroc Stretch On Slantboard  3 reps  30 sec holds On Slantboard  3 reps  30 sec holds On Slantboard  3 reps  30 sec holds Slant board  3 reps  30 second hold   Standing Heel/Toe Raises 20 reps each      Step-Ups 6 inch step  20 reps 6 inch step  20 reps 4 inch step  20 reps  Working on ascending using L quad 4 inch step  20 reps  Working on ascending using L quad   TKE with T-band Blue T-band  20 reps Black T-band  20 reps Black T-band  20 reps Black t-band  20 reps   Standing Knee Flexion Stretch on Step 10 reps  10 sec holds 10 reps  10 sec holds 10 reps  10 sec holds 10 reps  10 second hold   Seated Knee Flexion Stretch 10 reps  10 sec holds        Airdyne  6 minutes 7 minutes 7 minutes   Nautilus Leg Press  60 Lbs  20 reps 60 Lbs  20 reps 60 pounds  20 reps   Nautilus Calf Raises on Leg Press Machine  35 Lbs  20 reps 35 Lbs  20 reps 35 pounds  20 reps   Stair Training    3 steps  2 sets  Reciprocal pattern  Bilateral handrails 3 steps  2 sets  Reciprocal pattern  Bilateral handrails 3 steps  2 sets  Reciprocal pattern with B handrails     MANUAL THERAPY: (10 minutes): PROM with OP into L knee flexion and extension; soft tissue work through L gastroc/soleus in prone; soft tissue work through [employer]; scar massage L knee    MODALITIES: (0 minutes):  N/A today     Treatment/Session Summary:    · Response to Treatment:  Patient completed all activities with improving overall mobility and strength. · Communication/Consultation:  None today  · Equipment provided today:  None today  · Recommendations/Intent for next treatment session: Next visit will focus on progression of ROM/strengthening exercises as tolerable.     Total Treatment Billable Duration:  45 minutes  PT Patient Time In/Time Out  Time In: 0930  Time Out: [address],     Future Appointments   Date Time Provider Dusty Karimi   7/10/2020  9:30 AM Alen Tran PT [facility] SFE   7/14/2020  1:45 PM Derpricilaa Marko, PT [facility]   7/16/2020  1:45 PM Deronda Marko, PT [facility]   7/21/2020  9:30 AM Alen Tran PT [facility]   7/23/2020  1:00 PM Alen Tran PT [facility]   7/28/2020  1:45 PM Deronda Dibrenato, PT [facility]   7/30/2020  1:45 PM Derpricilaa Marko, PT [facility] SFE   8/10/2020  9:10 AM Angel Luis Dyson MD [place] [facility]

## 2020-07-13 ENCOUNTER — HOSPITAL ENCOUNTER (OUTPATIENT)
Dept: PHYSICAL THERAPY | Age: 50
Discharge: HOME | End: 2020-07-13
Payer: COMMERCIAL

## 2020-07-13 PROCEDURE — 97140 MANUAL THERAPY 1/> REGIONS: CPT

## 2020-07-13 PROCEDURE — 97110 THERAPEUTIC EXERCISES: CPT

## 2020-07-13 NOTE — PROGRESS NOTES
[employer]  : 1970  Primary: Mirlande Acosta Of [place] Makayla*  Secondary:  [facility] [place] [id number] [facility], [address], [address] [facility]  [phone]   [phone]         OUTPATIENT PHYSICAL THERAPY: Daily Treatment Note 2020  Visit Count:  8    ICD-10: Treatment Diagnosis: Pain in left knee (M25.562); Difficulty in walking, not elsewhere classified (R26.2)  Precautions/Allergies:   Patient has no known allergies. TREATMENT PLAN:  Effective Dates: 2020 TO 2020 (90 days). Frequency/Duration: 2 times a week for 90 Day(s)    Pre-treatment Symptoms/Complaints:  L Knee Pain; 2020: Patient states she had some \"electric\" feelings in her lateral L knee yesterday, but they are not as bad today. Pain: Initial:   3-4/10 Post Session:  3/10   Medications Last Reviewed:  2020  Updated Objective Findings:  None Today  TREATMENT:     THERAPEUTIC EXERCISE: (35 minutes):  Exercises per grid below to improve mobility and strength. Required minimal verbal cues to promote proper body alignment and promote proper body posture. Progressed resistance and repetitions as indicated.    Date:  20 Date:  20 Date:  20 Date:  7/10/2020 Date:  20   Activity/Exercise    Parameters    NuStep Level 4  6 minutes       Quad Sets 20 reps  5 sec holds  And with manual OP 20 reps  5 sec holds  And with manual OP 20 reps  5 sec holds  And with manual OP 20 reps  5 second hold with manual overpressure 20 reps  5 sec holds  And with manual OP   SLR Flexion 20 reps       Heel Slides with Strap for OP 20 reps  5 sec holds 20 reps  5 sec holds 20 reps  5 sec holds 20 reps  5 second hold 20 reps  5 sec holds   Gastroc Stretch On Slantboard  3 reps  30 sec holds On Slantboard  3 reps  30 sec holds On Slantboard  3 reps  30 sec holds Slant board  3 reps  30 second hold On Slantboard  3 reps  30 sec holds   Standing Heel/Toe Raises 20 reps each Step-Ups 6 inch step  20 reps 6 inch step  20 reps 4 inch step  20 reps  Working on ascending using L quad 4 inch step  20 reps  Working on ascending using L quad 4 inch step  20 reps  Working on ascending using L quad   TKE with T-band Blue T-band  20 reps Black T-band  20 reps Black T-band  20 reps Black t-band  20 reps Black T-band  20 reps   Standing Knee Flexion Stretch on Step 10 reps  10 sec holds 10 reps  10 sec holds 10 reps  10 sec holds 10 reps  10 second hold    Seated Knee Flexion Stretch 10 reps  10 sec holds         Airdyne  6 minutes 7 minutes 7 minutes 7 minutes   Nautilus Leg Press  60 Lbs  20 reps 60 Lbs  20 reps 60 pounds  20 reps 60 pounds  20 reps   Nautilus Calf Raises on Leg Press Machine  35 Lbs  20 reps 35 Lbs  20 reps 35 pounds  20 reps 60 pounds  20 reps   Stair Training    3 steps  2 sets  Reciprocal pattern  Bilateral handrails 3 steps  2 sets  Reciprocal pattern  Bilateral handrails 3 steps  2 sets  Reciprocal pattern with B handrails 3 steps  2 sets  Reciprocal pattern with B handrails     MANUAL THERAPY: (10 minutes): PROM with OP into L knee flexion and extension; soft tissue work through L gastroc/soleus in prone; soft tissue work through [employer]; scar massage L knee    MODALITIES: (0 minutes):  N/A today     Treatment/Session Summary:    · Response to Treatment:  L knee ext ROM improving. Gait improving. Pt did well with all treatments today. · Communication/Consultation:  None today  · Equipment provided today:  None today  · Recommendations/Intent for next treatment session: Next visit will focus on progression of ROM/strengthening exercises as tolerable.     Total Treatment Billable Duration:  45 minutes  PT Patient Time In/Time Out  Time In: 1300  Time Out: [place], PT    Future Appointments   Date Time Provider Dusty Karimi   7/15/2020  4:45 PM Volodymyr Christopher, PT [facility]   7/17/2020  2:30 PM [facility]   7/21/2020  9:30 AM Camille Bolus, PT [facility]   7/23/2020  1:00 PM Camille Wu, PT [facility] SFE   7/28/2020  1:45 PM Kelsey Melgar, PT [facility] SFE   7/30/2020  1:45 PM [facility]   8/10/2020  9:10 AM Kristie Dyson MD [place] [facility]

## 2020-07-17 ENCOUNTER — HOSPITAL ENCOUNTER (OUTPATIENT)
Dept: PHYSICAL THERAPY | Age: 50
Discharge: HOME | End: 2020-07-17
Payer: COMMERCIAL

## 2020-07-17 PROCEDURE — 97110 THERAPEUTIC EXERCISES: CPT

## 2020-07-17 PROCEDURE — 97140 MANUAL THERAPY 1/> REGIONS: CPT

## 2020-07-17 NOTE — PROGRESS NOTES
Carla Foote  : 1970  Primary: Donny Grimes Of Manjeet Benavides*  Secondary:  [facility] [place] [address] [facility], [address], [address] [facility]  [phone]   [phone]         OUTPATIENT PHYSICAL THERAPY: Daily Treatment Note 2020  Visit Count:  9    ICD-10: Treatment Diagnosis: Pain in left knee (M25.562); Difficulty in walking, not elsewhere classified (R26.2)  Precautions/Allergies:   Patient has no known allergies. TREATMENT PLAN:  Effective Dates: 2020 TO 2020 (90 days). Frequency/Duration: 2 times a week for 90 Day(s)    Pre-treatment Symptoms/Complaints:  L Knee Pain; 2020: Patient reports she is doing well, only a little stiff today. Pain: Initial:   3/10 Post Session:  3/10   Medications Last Reviewed:  2020  Updated Objective Findings:  L knee ext = -2 degrees, flexion = 133 degrees  TREATMENT:     THERAPEUTIC EXERCISE: (30 minutes):  Exercises per grid below to improve mobility and strength. Required minimal verbal cues to promote proper body alignment and promote proper body posture. Progressed resistance and repetitions as indicated.    Date:  7/10/2020 Date:  20 Date:  07-15-20 Date:  2020   Activity/Exercise Parameters   Parameters   Quad Sets 20 reps  5 second hold with manual overpressure 20 reps  5 sec holds  And with manual OP 20 reps  5 sec holds  And with manual OP 20 reps  5 second hold with manual overpressure   Heel Slides with Strap for OP 20 reps  5 second hold 20 reps  5 sec holds 20 reps  5 sec holds 20 reps  5 second hold   Gastroc Stretch Slant board  3 reps  30 second hold On Slantboard  3 reps  30 sec holds On Slantboard  3 reps  30 sec holds Slant board  3 reps  30 second hold   Step-Ups 4 inch step  20 reps  Working on ascending using L quad 4 inch step  20 reps  Working on ascending using L quad 4 inch step  20 reps  Working on ascending using L quad X    TKE with T-band Black t-band  20 reps Black T-band  20 reps Black T-band  20 reps Black t-band  20 reps   Standing Knee Flexion Stretch on Step 10 reps  10 second hold   10 reps  10 second hold   Airdyne 7 minutes 7 minutes 7 minutes 7 minutes   Nautilus Leg Press 60 pounds  20 reps 60 pounds  20 reps 65 Lbs x15 reps  85 Lbs x15 reps X        Nautilus Calf Raises on Leg Press Machine 35 pounds  20 reps 60 pounds  20 reps 60 pounds  20 reps X    Step ups    6 inch  15 reps   Stair Training   3 steps  2 sets  Reciprocal pattern with B handrails 3 steps  2 sets  Reciprocal pattern with B handrails 3 steps  3 sets  Reciprocal pattern with B handrails X      MANUAL THERAPY: (15 minutes): PROM with OP into L knee flexion and extension; soft tissue work through L gastroc/soleus in prone; soft tissue work through [employer]; scar massage L knee    MODALITIES: (0 minutes):  N/A today     Treatment/Session Summary:    · Response to Treatment:  Patient tolerated treatment well with improved mobility noted in L knee. · Communication/Consultation:  None today  · Equipment provided today:  None today  · Recommendations/Intent for next treatment session: Next visit will focus on progression of ROM/strengthening exercises as tolerable.     Total Treatment Billable Duration:  45 minutes  PT Patient Time In/Time Out  Time In: 1430  Time Out: [address] [facility], PT    Future Appointments   Date Time Provider Dusty Karimi   7/17/2020  2:30 PM Onelia Sánchez PT [facility] SFE   7/21/2020  9:30 AM Onelia Sánchez, PT [facility]   7/23/2020  1:00 PM Onelia Sánchez PT [facility]   7/28/2020  1:45 PM Vita Miguel PT [facility]   7/30/2020  1:45 PM Vita Miguel PT [facility] SFE   8/10/2020  9:10 AM Kayla Dyson MD [place] [facility]

## 2020-07-21 ENCOUNTER — HOSPITAL ENCOUNTER (OUTPATIENT)
Dept: PHYSICAL THERAPY | Age: 50
Discharge: HOME | End: 2020-07-21
Payer: COMMERCIAL

## 2020-07-21 PROCEDURE — 97140 MANUAL THERAPY 1/> REGIONS: CPT

## 2020-07-21 PROCEDURE — 97110 THERAPEUTIC EXERCISES: CPT

## 2020-07-21 NOTE — PROGRESS NOTES
Alicia Dohertyiot  : 1970  Primary: [facility] Mor[facility]  Secondary:  [facility] at [place] [id number] [facility], [address], [address] [facility]  [phone]   [phone]         OUTPATIENT PHYSICAL THERAPY: Daily Treatment Note 2020  Visit Count:  10    ICD-10: Treatment Diagnosis: Pain in left knee (M25.562); Difficulty in walking, not elsewhere classified (R26.2)  Precautions/Allergies:   Patient has no known allergies. TREATMENT PLAN:  Effective Dates: 2020 TO 2020 (90 days). Frequency/Duration: 2 times a week for 90 Day(s)    Pre-treatment Symptoms/Complaints:  L Knee Pain; 2020: Patient reports she feels a good bit of the swelling has gone down so that helps her move better. Pain: Initial:   3/10 Post Session:  3/10   Medications Last Reviewed:  2020  Updated Objective Findings:  PROM L knee ext = -1 degrees, flexion = 135 degrees  TREATMENT:     THERAPEUTIC EXERCISE: (30 minutes):  Exercises per grid below to improve mobility and strength. Required minimal verbal cues to promote proper body alignment and promote proper body posture. Progressed resistance and repetitions as indicated.    Date:  20 Date:  07-15-20 Date:  2020 Date:  2020   Activity/Exercise   Parameters Parameters   Quad Sets 20 reps  5 sec holds  And with manual OP 20 reps  5 sec holds  And with manual OP 20 reps  5 second hold with manual overpressure 20 reps  5 second hold with manual overpressure   Heel Slides with Strap for OP 20 reps  5 sec holds 20 reps  5 sec holds 20 reps  5 second hold 20 reps  5 second hold   Gastroc Stretch On Slantboard  3 reps  30 sec holds On Slantboard  3 reps  30 sec holds Slant board  3 reps  30 second hold Slant board  3 reps  30 second hold   Step-Ups 4 inch step  20 reps  Working on ascending using L quad 4 inch step  20 reps  Working on ascending using L quad X  6 inch step  20 reps  Working on ascending using L quad   TKE with T-band Black T-band  20 reps Black T-band  20 reps Black t-band  20 reps Black t-band  20 reps   Standing Knee Flexion Stretch on Step   10 reps  10 second hold 10 reps  10 second hold   Airdyne 7 minutes 7 minutes 7 minutes 7 minutes   Nautilus Leg Press 60 pounds  20 reps 65 Lbs x15 reps  85 Lbs x15 reps X         Nautilus Calf Raises on Leg Press Machine 60 pounds  20 reps 60 pounds  20 reps X     Stair Training   3 steps  2 sets  Reciprocal pattern with B handrails 3 steps  3 sets  Reciprocal pattern with B handrails X       MANUAL THERAPY: (15 minutes): PROM with OP into L knee flexion and extension; soft tissue work through L gastroc/soleus in prone; soft tissue work through [employer]; scar massage L knee    MODALITIES: (0 minutes):  N/A today     Treatment/Session Summary:    · Response to Treatment:  Patient tolerated treatment well with improved mobility noted in L knee. · Communication/Consultation:  None today  · Equipment provided today:  None today  · Recommendations/Intent for next treatment session: Next visit will focus on progression of ROM/strengthening exercises as tolerable.     Total Treatment Billable Duration:  45 minutes  PT Patient Time In/Time Out  Time In: 0930  Time Out: [address], PT    Future Appointments   Date Time Provider Dusty Karimi   7/21/2020  9:30 AM Fortino Raygoza PT [facility]   7/23/2020  1:00 PM Fortino Raygoza PT [facility]   7/28/2020  1:45 PM Masha Osman PT [facility]   7/30/2020  1:45 PM Masha Osman PT [facility]   8/5/2020  1:45 PM Fortino Raygoza PT [facility]   8/10/2020  9:10 AM Stephie Marina MD [place] [facility]   8/12/2020  1:45 PM Masha Osman PT [facility]   8/12/2020  2:30 PM SFE DEXA BI GE LUNAR DEXA SFERMAM [facility]FE   8/19/2020  1:45 PM Fortino Raygoza PT [facility]   8/26/2020  1:45 PM Masha Osman PT [facility]

## 2020-07-23 ENCOUNTER — HOSPITAL ENCOUNTER (OUTPATIENT)
Dept: PHYSICAL THERAPY | Age: 50
Discharge: HOME | End: 2020-07-23
Payer: COMMERCIAL

## 2020-07-23 PROCEDURE — 97110 THERAPEUTIC EXERCISES: CPT

## 2020-07-23 PROCEDURE — 97140 MANUAL THERAPY 1/> REGIONS: CPT

## 2020-07-23 NOTE — PROGRESS NOTES
Maggie Gonsalez  : 1970  Primary: [facility]  Secondary:  Therapy Center at [address] , [address], [address] [facility]  [phone]   [phone]         OUTPATIENT PHYSICAL THERAPY: Daily Treatment Note 2020  Visit Count:  11    ICD-10: Treatment Diagnosis: Pain in left knee (M25.562); Difficulty in walking, not elsewhere classified (R26.2)  Precautions/Allergies:   Patient has no known allergies. TREATMENT PLAN:  Effective Dates: 2020 TO 2020 (90 days). Frequency/Duration: 2 times a week for 90 Day(s)    Pre-treatment Symptoms/Complaints:  L Knee Pain; 2020: Patient reports she feels she is able to walk faster without a limp. Pain: Initial:   3/10 Post Session:  3/10   Medications Last Reviewed:  2020  Updated Objective Findings:  PROM L knee ext = -1 degrees, flexion = 135 degrees  TREATMENT:     THERAPEUTIC EXERCISE: (30 minutes):  Exercises per grid below to improve mobility and strength. Required minimal verbal cues to promote proper body alignment and promote proper body posture. Progressed resistance and repetitions as indicated.    Date:  20 Date:  07-15-20 Date:  2020 Date:  2020 Date:  2020   Activity/Exercise   Parameters Parameters Parameters   Quad Sets 20 reps  5 sec holds  And with manual OP 20 reps  5 sec holds  And with manual OP 20 reps  5 second hold with manual overpressure 20 reps  5 second hold with manual overpressure 20 reps  5 second hold with manual overpressure   Heel Slides with Strap for OP 20 reps  5 sec holds 20 reps  5 sec holds 20 reps  5 second hold 20 reps  5 second hold 20 reps  5 second hold   Gastroc Stretch On Slantboard  3 reps  30 sec holds On Slantboard  3 reps  30 sec holds Slant board  3 reps  30 second hold Slant board  3 reps  30 second hold Slant board  3 reps  30 second hold   Step-Ups 4 inch step  20 reps  Working on ascending using L quad 4 inch step  20 reps  Working on ascending using L quad X  6 inch step  20 reps  Working on ascending using L quad 6 inch step  20 reps  Working on ascending using L quad   TKE with T-band Black T-band  20 reps Black T-band  20 reps Black t-band  20 reps Black t-band  20 reps Black t-band  20 reps   Standing Knee Flexion Stretch on Step   10 reps  10 second hold 10 reps  10 second hold 10 reps  10 second hold   Airdyne 7 minutes 7 minutes 7 minutes 7 minutes 7 minutes   Nautilus Leg Press 60 pounds  20 reps 65 Lbs x15 reps  85 Lbs x15 reps X          Nautilus Calf Raises on Leg Press Machine 60 pounds  20 reps 60 pounds  20 reps X      Stair Training   3 steps  2 sets  Reciprocal pattern with B handrails 3 steps  3 sets  Reciprocal pattern with B handrails X        MANUAL THERAPY: (15 minutes): PROM with OP into L knee flexion and extension; soft tissue work through L gastroc/soleus in prone; soft tissue work through [employer]; scar massage L knee. Resisted PNF L LE to tolerance. MODALITIES: (0 minutes):  N/A today     Treatment/Session Summary:    · Response to Treatment:  Patient tolerated treatment well without improving mobility noted in AROM. · Communication/Consultation:  None today  · Equipment provided today:  None today  · Recommendations/Intent for next treatment session: Next visit will focus on progression of ROM/strengthening exercises as tolerable.     Total Treatment Billable Duration:  45 minutes  PT Patient Time In/Time Out  Time In: 1300  Time Out: [address], PT    Future Appointments   Date Time Provider Dusty Karimi   7/23/2020  1:00 PM Lashae Grimaldo, PT [facility]   7/28/2020  1:45 PM Charles Todd, PT [facility]   7/30/2020  1:45 PM Charles Todd, PT [facility]   8/5/2020  1:45 PM Lashae Grimaldo, PT [facility]   8/10/2020  9:10 AM Israel Salgado MD [place] [facility]   8/12/2020  1:45 PM Charles Halo, PT [facility]   8/12/2020  2:30 PM SFE DEXA BI GE LUNAR [facility]   8/19/2020  1:45 PM FERNANDEZ Viera DENTON   8/26/2020  1:45 PM FERNANDEZ Roger E

## 2020-07-28 ENCOUNTER — HOSPITAL ENCOUNTER (OUTPATIENT)
Dept: PHYSICAL THERAPY | Age: 50
Discharge: HOME | End: 2020-07-28
Payer: COMMERCIAL

## 2020-07-28 PROCEDURE — 97140 MANUAL THERAPY 1/> REGIONS: CPT

## 2020-07-28 PROCEDURE — 97110 THERAPEUTIC EXERCISES: CPT

## 2020-07-28 NOTE — PROGRESS NOTES
Lesvivian Desir  : 1970  Primary: [facility]*  Secondary:  Therapy Center at [address], [address], [place] [address].  [phone]   [phone]         OUTPATIENT PHYSICAL THERAPY: Daily Treatment Note 2020  Visit Count:  12    ICD-10: Treatment Diagnosis: Pain in left knee (M25.562); Difficulty in walking, not elsewhere classified (R26.2)  Precautions/Allergies:   Patient has no known allergies. TREATMENT PLAN:  Effective Dates: 2020 TO 2020 (90 days). Frequency/Duration: 2 times a week for 90 Day(s)    Pre-treatment Symptoms/Complaints:  L Knee Pain; 2020: Patient states she is feeling pretty good today. Pain: Initial:   -2/10 Post Session:  -2/10   Medications Last Reviewed:  2020  Updated Objective Findings:  PROM L knee ext = -1 degrees, flexion = 135 degrees  TREATMENT:     THERAPEUTIC EXERCISE: (25 minutes):  Exercises per grid below to improve mobility and strength. Required minimal verbal cues to promote proper body alignment and promote proper body posture. Progressed resistance and repetitions as indicated.    Date:  07-15-20 Date:  2020 Date:  2020 Date:  2020 Date:  20   Activity/Exercise  Parameters Parameters Parameters    Quad Sets 20 reps  5 sec holds  And with manual OP 20 reps  5 second hold with manual overpressure 20 reps  5 second hold with manual overpressure 20 reps  5 second hold with manual overpressure 20 reps  5 second hold  With manual overpressure   Heel Slides with Strap for OP 20 reps  5 sec holds 20 reps  5 second hold 20 reps  5 second hold 20 reps  5 second hold 20 reps  5 sec holds   Gastroc Stretch On Slantboard  3 reps  30 sec holds Slant board  3 reps  30 second hold Slant board  3 reps  30 second hold Slant board  3 reps  30 second hold On Slantboard  3 reps  30 sec holds   Step-Ups 4 inch step  20 reps  Working on ascending using L quad X  6 inch step  20 reps  Working on ascending using L quad 6 inch step  20 reps  Working on ascending using L quad 6 inch step  20 reps  Working on ascending using L quad   TKE with T-band Black T-band  20 reps Black t-band  20 reps Black t-band  20 reps Black t-band  20 reps Black T-band  20 reps   Standing Knee Flexion Stretch on Step  10 reps  10 second hold 10 reps  10 second hold 10 reps  10 second hold 10 reps  10 sec holds   Airdyne 7 minutes 7 minutes 7 minutes 7 minutes 6 minutes   Nautilus Leg Press 65 Lbs x15 reps  85 Lbs x15 reps X           Nautilus Calf Raises on Leg Press Machine 60 pounds  20 reps X       Stair Training   3 steps  3 sets  Reciprocal pattern with B handrails X         MANUAL THERAPY: (15 minutes): PROM with OP into L knee flexion and extension; soft tissue work through L gastroc/soleus in prone; soft tissue work through [employer]; scar massage L knee. Manual L hamstring stretching. MODALITIES: (0 minutes):  N/A today     Treatment/Session Summary:    · Response to Treatment:  Pt did well with all treatments today. ROM continues to improve as well as technique on stairs. · Communication/Consultation:  None today  · Equipment provided today:  None today  · Recommendations/Intent for next treatment session: Next visit will focus on progression of ROM/strengthening exercises as tolerable.     Total Treatment Billable Duration:  45 minutes  PT Patient Time In/Time Out  Time In: 1355  Time Out: Sidney Levin PT    Future Appointments   Date Time Provider Dusty Karimi   7/30/2020  1:45 PM Heriberto Mario [facility]   8/5/2020  1:45 PM Della Wray PT [facility]   8/10/2020  9:10 AM Demario Bennett MD [place] [facility]   8/12/2020  1:45 PM Bradley Schneider PT [facility]   8/12/2020  2:30 PM [facility] DEXA BI GE LUNAR DEXA SFERMAM [facility]   8/19/2020  1:45 PM Della Wray PT [facility]   8/26/2020  1:45 PM Bradley Schneider PT [facility]

## 2020-07-30 ENCOUNTER — HOSPITAL ENCOUNTER (OUTPATIENT)
Dept: PHYSICAL THERAPY | Age: 50
Discharge: HOME | End: 2020-07-30
Payer: COMMERCIAL

## 2020-07-30 PROCEDURE — 97140 MANUAL THERAPY 1/> REGIONS: CPT

## 2020-07-30 PROCEDURE — 97110 THERAPEUTIC EXERCISES: CPT

## 2020-07-30 NOTE — PROGRESS NOTES
Vamshi Biggs  : 1970  Primary: Joan Marinelli [facility]*  Secondary:  Therapy Center at [address] [place] [address], Suite [facility], [address] [place] [address]  [phone]   [phone]         OUTPATIENT PHYSICAL THERAPY: Daily Treatment Note 2020  Visit Count:  13    ICD-10: Treatment Diagnosis: Pain in left knee (M25.562); Difficulty in walking, not elsewhere classified (R26.2)  Precautions/Allergies:   Patient has no known allergies. TREATMENT PLAN:  Effective Dates: 2020 TO 2020 (90 days). Frequency/Duration: 2 times a week for 90 Day(s)    Pre-treatment Symptoms/Complaints:  L Knee Pain; 2020: Patient states she is feeling pretty good today. Pain: Initial:   1-2/10 Post Session:  -2/10   Medications Last Reviewed:  2020  Updated Objective Findings:  PROM L knee ext = -1 degrees, flexion = 135 degrees  TREATMENT:     THERAPEUTIC EXERCISE: (30 minutes):  Exercises per grid below to improve mobility and strength. Required minimal verbal cues to promote proper body alignment and promote proper body posture. Progressed resistance and repetitions as indicated.    Date:  07-15-20 Date:  2020 Date:  2020 Date:  2020 Date:  20 Date:  20   Activity/Exercise  Parameters Parameters Parameters     Quad Sets 20 reps  5 sec holds  And with manual OP 20 reps  5 second hold with manual overpressure 20 reps  5 second hold with manual overpressure 20 reps  5 second hold with manual overpressure 20 reps  5 second hold  With manual overpressure 20 reps  5 sec holds  And with manual OP   Heel Slides with Strap for OP 20 reps  5 sec holds 20 reps  5 second hold 20 reps  5 second hold 20 reps  5 second hold 20 reps  5 sec holds 20 reps  5 sec holds   Gastroc Stretch On Slantboard  3 reps  30 sec holds Slant board  3 reps  30 second hold Slant board  3 reps  30 second hold Slant board  3 reps  30 second hold On Slantboard  3 reps  30 sec holds On Slantboard  3 reps  30 sec holds   Step-Ups 4 inch step  20 reps  Working on ascending using L quad X  6 inch step  20 reps  Working on ascending using L quad 6 inch step  20 reps  Working on ascending using L quad 6 inch step  20 reps  Working on ascending using L quad 6 inch step  20 reps  Working ascending using L quad   TKE with T-band Black T-band  20 reps Black t-band  20 reps Black t-band  20 reps Black t-band  20 reps Black T-band  20 reps Black T-band  20 reps   Standing Knee Flexion Stretch on Step  10 reps  10 second hold 10 reps  10 second hold 10 reps  10 second hold 10 reps  10 sec holds 10 reps  10 sec holds   Airdyne 7 minutes 7 minutes 7 minutes 7 minutes 6 minutes 6 minutes   Nautilus Leg Press 65 Lbs x15 reps  85 Lbs x15 reps X         85 Lbs  20 reps   Nautilus Calf Raises on Leg Press Machine 60 pounds  20 reps X     85 Lbs  15 reps   Stair Training   3 steps  3 sets  Reciprocal pattern with B handrails X        Nautilus Leg Curls      45 Lbs  20 reps     MANUAL THERAPY: (15 minutes): PROM with OP into L knee flexion and extension; soft tissue work through L gastroc/soleus in prone; soft tissue work through [employer]; scar massage L knee. Manual L hamstring stretching. MODALITIES: (0 minutes):  N/A today     Treatment/Session Summary:    · Response to Treatment:  Overall ROM/strength and function continue to improve. · Communication/Consultation:  None today  · Equipment provided today:  None today  · Recommendations/Intent for next treatment session: Next visit will focus on progression of ROM/strengthening exercises as tolerable.     Total Treatment Billable Duration:  45 minutes  PT Patient Time In/Time Out  Time In: 2793  Time Out: [address], PT    Future Appointments   Date Time Provider Dusty Sachi   8/5/2020  1:45 PM Lena Stanley [facility]   8/10/2020  9:10 AM Pete Paiz MD [place] [facility]   8/10/2020  3:15 PM Lena Stanley, PT [facility] 8/12/2020  2:30 PM SFE DEXA BI GE LUNAR DEXA SFERMAM [facility]   8/12/2020  7:00 PM Volodymyr Christopher, PT [facility]   8/19/2020  1:45 PM Anniece Habermann, PT [facility]   8/26/2020  1:45 PM Volodymyr Christopher, PT [facility]

## 2020-08-05 ENCOUNTER — HOSPITAL ENCOUNTER (OUTPATIENT)
Dept: PHYSICAL THERAPY | Age: 50
Discharge: HOME | End: 2020-08-05
Payer: COMMERCIAL

## 2020-08-05 PROCEDURE — 97110 THERAPEUTIC EXERCISES: CPT

## 2020-08-05 PROCEDURE — 97140 MANUAL THERAPY 1/> REGIONS: CPT

## 2020-08-05 NOTE — PROGRESS NOTES
Abilio Kllisavivian  : 1970  Primary: Phylliss Duval [facility]*  Secondary:  [facility] at [place] [id number] [facility], [address], [place] [address].  [phone]   [phone]         OUTPATIENT PHYSICAL THERAPY: Daily Treatment Note 2020  Visit Count:  14    ICD-10: Treatment Diagnosis: Pain in left knee (M25.562); Difficulty in walking, not elsewhere classified (R26.2)  Precautions/Allergies:   Patient has no known allergies. TREATMENT PLAN:  Effective Dates: 2020 TO 2020 (90 days). Frequency/Duration: 2 times a week for 90 Day(s)    Pre-treatment Symptoms/Complaints:  L Knee Pain; 2020: Patient states she saw MD for follow up and he was pleased with her progress. She states she has soreness and tightness in her L IT Band today. Pain: Initial:   1-10 Post Session:  -2/10   Medications Last Reviewed:  2020  Updated Objective Findings:  None Today  TREATMENT:     THERAPEUTIC EXERCISE: (30 minutes):  Exercises per grid below to improve mobility and strength. Required minimal verbal cues to promote proper body alignment and promote proper body posture. Progressed resistance and repetitions as indicated.    Date:  2020 Date:  20 Date:  20 Date:  20   Activity/Exercise Parameters      Quad Sets 20 reps  5 second hold with manual overpressure 20 reps  5 second hold  With manual overpressure 20 reps  5 sec holds  And with manual OP    Heel Slides with Strap for OP 20 reps  5 second hold 20 reps  5 sec holds 20 reps  5 sec holds    Gastroc Stretch Slant board  3 reps  30 second hold On Slantboard  3 reps  30 sec holds On Slantboard  3 reps  30 sec holds On Slantboard  3 reps  30 sec holds   Step-Ups 6 inch step  20 reps  Working on ascending using L quad 6 inch step  20 reps  Working on ascending using L quad 6 inch step  20 reps  Working ascending using L quad 6 inch step  20 reps  Working on ascending using L quad   TKE with T-band Black t-band  20 reps Black T-band  20 reps Black T-band  20 reps Black T-band  20 reps   Standing Knee Flexion Stretch on Step 10 reps  10 second hold 10 reps  10 sec holds 10 reps  10 sec holds 10 reps  10 sec holds   Airdyne 7 minutes 6 minutes 6 minutes 6 minutes   Nautilus Leg Press   85 Lbs  20 reps 85 Lbs  20 reps   Nautilus Calf Raises on Leg Press Machine   85 Lbs  15 reps 85 Lbs  20 reps   Stair Training         Nautilus Leg Curls   45 Lbs  20 reps 50 Lbs  20 reps     MANUAL THERAPY: (15 minutes):  Manual L hamstring stretching, rolling to L IT Band and soft tissue work through L quad    MODALITIES: (0 minutes):  N/A today     Treatment/Session Summary:    · Response to Treatment:  Overall ROM/strength and function continue to improve. · Communication/Consultation:  None today  · Equipment provided today:  None today  · Recommendations/Intent for next treatment session: Next visit will focus on progression of ROM/strengthening exercises as tolerable.     Total Treatment Billable Duration:  45 minutes  PT Patient Time In/Time Out  Time In: 8336  Time Out: 200 [facility], PT    Future Appointments   Date Time Provider Dusty Karimi   8/10/2020  9:10 AM Timur Mario MD [place] [facility]   8/10/2020  3:15 PM Marisa Grier, PT [facility]   8/12/2020  7:00 PM Marisa Grier, PT [facility]   8/19/2020  1:45 PM Sarah Becerra PT [facility]   8/26/2020 12:30 PM SFE DEXA BI GE LUNAR DEXA SFERMAM [facility]FE   8/26/2020  1:45 PM Marisa Grier, PT [facility]

## 2020-08-10 ENCOUNTER — HOSPITAL ENCOUNTER (OUTPATIENT)
Dept: PHYSICAL THERAPY | Age: 50
Discharge: HOME | End: 2020-08-10
Payer: COMMERCIAL

## 2020-08-10 PROCEDURE — 97140 MANUAL THERAPY 1/> REGIONS: CPT

## 2020-08-10 PROCEDURE — 97110 THERAPEUTIC EXERCISES: CPT

## 2020-08-10 NOTE — PROGRESS NOTES
Les Desir  : 1970  Primary: Angela [facility]*  Secondary:  Therapy Center at [address], [address], [place] [address].  [phone]   [phone]         OUTPATIENT PHYSICAL THERAPY: Daily Treatment Note 8/10/2020  Visit Count:  15    ICD-10: Treatment Diagnosis: Pain in left knee (M25.562); Difficulty in walking, not elsewhere classified (R26.2)  Precautions/Allergies:   Patient has no known allergies. TREATMENT PLAN:  Effective Dates: 2020 TO 2020 (90 days). Frequency/Duration: 2 times a week for 90 Day(s)    Pre-treatment Symptoms/Complaints:  L Knee Pain; 8/10/2020: Pt states her knee is feeling fine today. Pain: Initial:   -2/10 Post Session:  1-2/10   Medications Last Reviewed:  8/10/2020  Updated Objective Findings:  None Today  TREATMENT:     THERAPEUTIC EXERCISE: (30 minutes):  Exercises per grid below to improve mobility and strength. Required minimal verbal cues to promote proper body alignment and promote proper body posture. Progressed resistance and repetitions as indicated.    Date:  2020 Date:  20 Date:  20 Date:  20 Date:  08-10-20   Activity/Exercise Parameters       Quad Sets 20 reps  5 second hold with manual overpressure 20 reps  5 second hold  With manual overpressure 20 reps  5 sec holds  And with manual OP     Heel Slides with Strap for OP 20 reps  5 second hold 20 reps  5 sec holds 20 reps  5 sec holds     Gastroc Stretch Slant board  3 reps  30 second hold On Slantboard  3 reps  30 sec holds On Slantboard  3 reps  30 sec holds On Slantboard  3 reps  30 sec holds On Slantboard  3 reps  30 sec holds   Step-Ups 6 inch step  20 reps  Working on ascending using L quad 6 inch step  20 reps  Working on ascending using L quad 6 inch step  20 reps  Working ascending using L quad 6 inch step  20 reps  Working on ascending using L quad 6 inch step  20 reps  Working on ascending using L quad   TKE with T-band Black t-band  20 reps Black T-band  20 reps Black T-band  20 reps Black T-band  20 reps    Standing Knee Flexion Stretch on Step 10 reps  10 second hold 10 reps  10 sec holds 10 reps  10 sec holds 10 reps  10 sec holds 10 reps  10 sec holds   Airdyne 7 minutes 6 minutes 6 minutes 6 minutes 8 minutes   Nautilus Leg Press   85 Lbs  20 reps 85 Lbs  20 reps 110 Lbs  20    Nautilus Calf Raises on Leg Press Machine   85 Lbs  15 reps 85 Lbs  20 reps 85 Lbs  20 reps   Stair Training          Nautilus Leg Curls   45 Lbs  20 reps 50 Lbs  20 reps 55 Lbs  20 reps     MANUAL THERAPY: (15 minutes):  Manual L hamstring stretching, rolling to L IT Band and soft tissue work through L quad. Also overpressure into L knee extension. MODALITIES: (0 minutes):  N/A today     Treatment/Session Summary:    · Response to Treatment:  L knee strength and ROM continue to improve. · Communication/Consultation:  None today  · Equipment provided today:  None today  · Recommendations/Intent for next treatment session: Next visit will focus on progression of ROM/strengthening exercises as tolerable.     Total Treatment Billable Duration:  45 minutes  PT Patient Time In/Time Out  Time In: 3158  Time Out: 1600  Douglas Ivory PT    Future Appointments   Date Time Provider Dusty Karimi   8/12/2020  7:00 PM Derian Patel [facility]   8/19/2020  1:45 PM Darwin Palma, PT [facility]   8/26/2020 12:30 PM SFE DEXA BI GE LUNAR DEXA SFERMAM [facility]FE   8/26/2020  1:45 PM Derian Patel PT [facility]

## 2020-08-12 ENCOUNTER — HOSPITAL ENCOUNTER (OUTPATIENT)
Dept: PHYSICAL THERAPY | Age: 50
Discharge: HOME | End: 2020-08-12
Payer: COMMERCIAL

## 2020-08-12 NOTE — PROGRESS NOTES
Pt cancelled her physical therapy for today (greater than 24 hrs in advance).     Elisabeth Rosado, PT

## 2020-08-19 ENCOUNTER — HOSPITAL ENCOUNTER (OUTPATIENT)
Dept: PHYSICAL THERAPY | Age: 50
Discharge: HOME | End: 2020-08-19
Payer: COMMERCIAL

## 2020-08-19 PROCEDURE — 97140 MANUAL THERAPY 1/> REGIONS: CPT

## 2020-08-19 PROCEDURE — 97110 THERAPEUTIC EXERCISES: CPT

## 2020-08-19 NOTE — PROGRESS NOTES
Les Desir  : 1970  Primary: Agataer Irvin Of Manjeet Benavides*  Secondary:  Therapy Center at [address] [place] [address], Suite [facility], [address] [facility]  [phone]   [phone]         OUTPATIENT PHYSICAL THERAPY: Daily Treatment Note 2020  Visit Count:  16    ICD-10: Treatment Diagnosis: Pain in left knee (M25.562); Difficulty in walking, not elsewhere classified (R26.2)  Precautions/Allergies:   Patient has no known allergies. TREATMENT PLAN:  Effective Dates: 2020 TO 2020 (90 days). Frequency/Duration: 2 times a week for 90 Day(s)    Pre-treatment Symptoms/Complaints:  L Knee Pain; 2020: Patient reports since she missed a week because of her son's surgery, she's been feeling stiff. Pain: Initial:   2/10 Post Session:  2/10   Medications Last Reviewed:  2020  Updated Objective Findings:  None Today  TREATMENT:     THERAPEUTIC EXERCISE: (30 minutes):  Exercises per grid below to improve mobility and strength. Required minimal verbal cues to promote proper body alignment and promote proper body posture. Progressed resistance and repetitions as indicated.    Date:  20 Date:  20 Date:  08-10-20 Date:  2020   Activity/Exercise    Parameters   Quad Sets 20 reps  5 sec holds  And with manual OP      Heel Slides with Strap for OP 20 reps  5 sec holds      Gastroc Stretch On Slantboard  3 reps  30 sec holds On Slantboard  3 reps  30 sec holds On Slantboard  3 reps  30 sec holds Slantboard  3 reps  30 second hold   Step-Ups 6 inch step  20 reps  Working ascending using L quad 6 inch step  20 reps  Working on ascending using L quad 6 inch step  20 reps  Working on ascending using L quad 6 inch step  20 reps   TKE with T-band Black T-band  20 reps Black T-band  20 reps  Black t-band  20 reps   Standing Knee Flexion Stretch on Step 10 reps  10 sec holds 10 reps  10 sec holds 10 reps  10 sec holds 10 reps  10 second hold   Moriah Emery minutes 6 minutes 8 minutes 8 minutes   Nautilus Leg Press 85 Lbs  20 reps 85 Lbs  20 reps 110 Lbs  20  110 pounds  20 reps   Nautilus Calf Raises on Leg Press Machine 85 Lbs  15 reps 85 Lbs  20 reps 85 Lbs  20 reps 85 pounds  20 reps   Nautilus Leg Curls 45 Lbs  20 reps 50 Lbs  20 reps 55 Lbs  20 reps 55 pounds  20 reps     MANUAL THERAPY: (15 minutes):  Manual L hamstring stretching, rolling to L IT Band and soft tissue work through L quad. Also overpressure into L knee extension. Treatment/Session Summary:    · Response to Treatment: Patient tolerated treatment well with improved mobility noted after treatment. · Communication/Consultation:  None today  · Equipment provided today:  None today  · Recommendations/Intent for next treatment session: Next visit will focus on progression of ROM/strengthening exercises as tolerable.     Total Treatment Billable Duration:  45 minutes  PT Patient Time In/Time Out  Time In: 1345  Time Out: FERNANDEZ [place]    Future Appointments   Date Time Provider Dusty Karimi   8/19/2020  1:45 PM Axel Pereira PT [facility] SFE   8/26/2020 12:30 PM GAVIN DEXA BI  LUNAR DEXA MARIA DE JESUS SFE   8/26/2020  1:45 PM Keyonna Forte PT [facility]

## 2020-08-26 ENCOUNTER — HOSPITAL ENCOUNTER (OUTPATIENT)
Dept: PHYSICAL THERAPY | Age: 50
Discharge: HOME | End: 2020-08-26
Payer: COMMERCIAL

## 2020-08-26 NOTE — PROGRESS NOTES
Pt cancelled her physical therapy appt for today (greater than 24 hrs in advance) and rescheduled for tomorrow).     [email or web address], PT

## 2020-08-27 ENCOUNTER — HOSPITAL ENCOUNTER (OUTPATIENT)
Dept: PHYSICAL THERAPY | Age: 50
Discharge: HOME | End: 2020-08-27
Payer: COMMERCIAL

## 2020-08-27 PROCEDURE — 97140 MANUAL THERAPY 1/> REGIONS: CPT

## 2020-08-27 PROCEDURE — 97110 THERAPEUTIC EXERCISES: CPT

## 2020-08-27 NOTE — PROGRESS NOTES
Jermain Forbes  : 1970  Primary: Pravin Seth Of [facility] Benavides*  Secondary:  Therapy Center at [address] [place] [address] [address], [address] [place] [address], [place] [address].  [phone]   [phone]           OUTPATIENT PHYSICAL THERAPY:Progress Report 2020   ICD-10: Treatment Diagnosis: Pain in left knee (M25.562); Difficulty in walking, not elsewhere classified (R26.2)  Precautions/Allergies:   Patient has no known allergies. TREATMENT PLAN:  Effective Dates: 2020 TO 2020 (90 days). Frequency/Duration: 2 times a week for 90 Day(s) MEDICAL/REFERRING DIAGNOSIS:  knee   DATE OF ONSET: Surgery 20  REFERRING PHYSICIAN: Frank Benitez MD MD Orders: Evaluate and Treat  Return MD Appointment: 20     INITIAL ASSESSMENT:  Ms. Jess Martin has attended 17 visits of physical therapy from 20 to 20 with increased ROM/strength L knee. Pt would benefit from continued skilled physical therapy services for continued ROM/strengthening and manual therapy to help patient return to prior level of function. PROBLEM LIST (Impacting functional limitations):  1. Decreased Strength  2. Decreased ADL/Functional Activities  3. Increased Pain  4. Decreased Flexibility/Joint Mobility  5. Decreased Tripp with Home Exercise Program INTERVENTIONS PLANNED: (Treatment may consist of any combination of the following)  1. Balance Exercise  2. Cold  3. Cryotherapy  4. Electrical Stimulation  5. Gait Training  6. Home Exercise Program (HEP)  7. Manual Therapy  8. Range of Motion (ROM)  9. Therapeutic Exercise/Strengthening     GOALS: (Goals have been discussed and agreed upon with patient.)  Short-Term Functional Goals: Time Frame: 4 weeks  1. Pt will increase ROM L knee 0-120 degrees to assist with ascending/descending stairs and household ADL's (Goal Met)  2.  Pt will increase strength L knee 4-/5 to assist with ascending/descending stairs and household ADL's (Goal Met)  3. Pt will be independent with HEP (Goal Met)  Discharge Goals: Time Frame: 12 weeks  1. Pt will increase strength L knee 4+/5 to assist with ascending/descending stairs and household ADL's (Goal Met)  2. Pt will ascend/descend 10 eight inch steps with bilateral handrails using a reciprocal pattern independently with min to no c/o L knee pain  3. Pt will perform 20 minutes household cleaning activities independently with min to no c/o L knee pain    OUTCOME MEASURE:   Tool Used: Lower Extremity Functional Scale (LEFS)  Score:  Initial: 24/80 Most Recent: 40/80 (Date: 08-27-20)   Interpretation of Score: 20 questions each scored on a 5 point scale with 0 representing \"extreme difficulty or unable to perform\" and 4 representing \"no difficulty\". The lower the score, the greater the functional disability. 80/80 represents no disability. Minimal detectable change is 9 points. MEDICAL NECESSITY:   · Patient is expected to demonstrate progress in strength, range of motion and functional technique to increase independence with ascending/descending stairs and household ADL's. · Patient demonstrates good rehab potential due to higher previous functional level. REASON FOR SERVICES/OTHER COMMENTS:  · Patient continues to require skilled intervention due to decreased ROM/strength L kene with increased pain leading to decreased functional status. Total Duration:  PT Patient Time In/Time Out  Time In: 1430  Time Out: 1515    Rehabilitation Potential For Stated Goals: Good  Regarding Nguyễn Montejo's therapy, I certify that the treatment plan above will be carried out by a therapist or under their direction.   Thank you for this referral,  Yoko Myles PT     Referring Physician Signature: Rea Flores MD _______________________________ Date _____________     PAIN/SUBJECTIVE:   Initial:   3-4/10 Post Session:  3/10   HISTORY:   History of Injury/Illness (Reason for Referral):  Pt reports bilateral knee pain of greater than 10 years duration. She had R TKA 01-10-18. She then had L TKA 20. She spent 1 night in hospital followed by home health PT until last Thursday. She saw MD today and he said everything was looking good. She rates her current L knee pain 3-4/10. Pt is taking Tramadol at night. Pt is sleeping in the bed. Pt reports difficulties sleeping at night due to L knee pain. Pt stopped using her walker about 10 days post-op. Pt drove to PT today and states she did fine. Past Medical History/Comorbidities:   Ms. Erica Little  has a past medical history of Allergic rhinitis, Arthritis, GERD (gastroesophageal reflux disease), and Laryngeal ulceration. Ms. Erica Little  has a past surgical history that includes hx  section (); hx abdominal wall defect repair; implant breast silicone/eq (Bilateral, 2010); and implant breast silicone/eq (Bilateral, 2018). Social History/Living Environment:     Pt lives with  and 4 children in a one story house with 3 steps to enter. She is currently having difficulties ascending/descending stairs due to her L knee pain  Prior Level of Function/Work/Activity:  Pt currently working from home   Dominant Side:         RIGHT  Other Clinical Tests:          N/A  Personal Factors:          Sex:  female        Age:  47 y.o. Profession:  Pt works part time for [employer]    Ambulatory/Rehab [place] Risk Assessment   Risk Factors:       No Risk Factors Identified Ability to Rise from Chair:       (1)  Pushes up, successful in one attempt   Falls Prevention Plan:       No modifications necessary   Total: (5 or greater = High Risk): 1    [facility] [address] [place] [address] States Patent #[id number].  Federal Law prohibits the replication, distribution or use without written permission from [facility] [employer]   Current Medications:       Current Outpatient Medications:     omeprazole ([address]) 40 mg capsule, TAKE 1 CAPSULE BY MOUTH TWICE A DAY, Disp: 180 Cap, Rfl: 3    iron ps complex/B12/folic acid (FERREX 215 FORTE PO), Take 150 mg by mouth two (2) times a day., Disp: , Rfl:     ascorbic acid, vitamin C, (VITAMIN C) 500 mg tablet, Take 500 mg by mouth two (2) times a day., Disp: , Rfl:     acetaminophen (TYLENOL) 500 mg tablet, Take 1,000 mg by mouth every eight (8) hours as needed for Pain., Disp: , Rfl:     folic acid (FOLVITE) 1 mg tablet, Take 1 mg by mouth daily. , Disp: , Rfl:     oxyCODONE (OXYIR) 5 mg capsule, Take 5 mg by mouth every four (4) hours as needed for Pain., Disp: , Rfl:     aspirin delayed-release 81 mg tablet, Take 81 mg by mouth two (2) times a day., Disp: , Rfl:     traMADoL (ULTRAM) 50 mg tablet, Take 50 mg by mouth every six (6) hours as needed for Pain. Take 1-2 tabs every 6 hours as needed for pain, Disp: , Rfl:     senna (Senna Lax) 8.6 mg tablet, Take 1 Tab by mouth daily. , Disp: , Rfl:     polyethylene glycol (MIRALAX) 17 gram packet, Take 17 g by mouth daily. Mix with 8oz of water, Disp: , Rfl:     butalbital-acetaminophen-caffeine (FIORICET, ESGIC) -40 mg per tablet, Take 1 Tab by mouth every six (6) hours as needed for Headache., Disp: , Rfl:     diclofenac-misoprostol (ARTHROTEC 50)  mg-mcg per tablet, Take 1 Tab by mouth two (2) times a day. Indications: OSTEOARTHRITIS IN PATIENT AT HIGH GASTRIC ULCER RISK, Disp: 60 Tab, Rfl: 5    diphenhydrAMINE (BENADRYL ALLERGY) 25 mg tablet, Take 25 mg by mouth every six (6) hours as needed for Sleep., Disp: , Rfl:    Date Last Reviewed:  06-24-20   Number of Personal Factors/Comorbidities that affect the Plan of Care: 1-2: MODERATE COMPLEXITY   EXAMINATION:   Observation/Orthostatic Postural Assessment:           Forward head, rounded shoulders  Palpation:          Generalized tenderness L knee  ROM:    R knee extension = 0 degrees  R knee flexion = 125 degrees    L knee extension = 0 degrees  L knee flexion = 130 degrees    Strength:    R knee extension = 5/5  R knee flexion = 5/5    L knee extension = 4+/5  L knee flexion = 4+/5    Special Tests:          N/A  Neurological Screen:        Sensation: Diminished sensation lateral L knee  Functional Mobility:         Gait/Ambulation:  Pt walking with moderate antalgic gait and no assistive device        Transfers:  Pt able to transition sit to supine and supine to sit independently    Body Structures Involved:  1. Bones  2. Joints  3. Muscles Body Functions Affected:  1. Sensory/Pain  2. Neuromusculoskeletal Activities and Participation Affected:  1. Mobility  2.  Domestic Life   Number of elements (examined above) that affect the Plan of Care: 3: MODERATE COMPLEXITY   CLINICAL PRESENTATION:   Presentation: Evolving clinical presentation with changing clinical characteristics: MODERATE COMPLEXITY   CLINICAL DECISION MAKING:   Use of outcome tool(s) and clinical judgement create a POC that gives a: Questionable prediction of patient's progress: MODERATE COMPLEXITY

## 2020-08-27 NOTE — PROGRESS NOTES
Maggie Gonsalez  : 1970  Primary: Marianna Trixie [facility] Makayla*  Secondary:  Therapy Center at [place] [address] [facility], [address], [address]  [phone]   [phone]         OUTPATIENT PHYSICAL THERAPY: Daily Treatment Note 2020  Visit Count:  17    ICD-10: Treatment Diagnosis: Pain in left knee (M25.562); Difficulty in walking, not elsewhere classified (R26.2)  Precautions/Allergies:   Patient has no known allergies. TREATMENT PLAN:  Effective Dates: 2020 TO 2020 (90 days). Frequency/Duration: 2 times a week for 90 Day(s)    Pre-treatment Symptoms/Complaints:  L Knee Pain; 2020: Pt states she has been doing some exercises in the pool which have helped some. Pain: Initial:   2/10 Post Session:  2/10   Medications Last Reviewed:  2020  Updated Objective Findings:  None Today  TREATMENT:     THERAPEUTIC EXERCISE: (30 minutes):  Exercises per grid below to improve mobility and strength. Required minimal verbal cues to promote proper body alignment and promote proper body posture. Progressed resistance and repetitions as indicated.    Date:  20 Date:  20 Date:  08-10-20 Date:  2020 Date:  20   Activity/Exercise    Parameters    Quad Sets 20 reps  5 sec holds  And with manual OP       Heel Slides with Strap for OP 20 reps  5 sec holds       Gastroc Stretch On Slantboard  3 reps  30 sec holds On Slantboard  3 reps  30 sec holds On Slantboard  3 reps  30 sec holds Slantboard  3 reps  30 second hold On Slantboard  3 reps  30 sec holds   Step-Ups 6 inch step  20 reps  Working ascending using L quad 6 inch step  20 reps  Working on ascending using L quad 6 inch step  20 reps  Working on ascending using L quad 6 inch step  20 reps 6 inch step  20 reps   TKE with T-band Black T-band  20 reps Black T-band  20 reps  Black t-band  20 reps Black T-band  20 reps   Standing Knee Flexion Stretch on Step 10 reps  10 sec holds 10 reps  10 sec holds 10 reps  10 sec holds 10 reps  10 second hold 10 reps  10 sec holds   Airdyne 6 minutes 6 minutes 8 minutes 8 minutes 7 minutes   Nautilus Leg Press 85 Lbs  20 reps 85 Lbs  20 reps 110 Lbs  20  110 pounds  20 reps 110 pounds  20 reps   Nautilus Calf Raises on Leg Press Machine 85 Lbs  15 reps 85 Lbs  20 reps 85 Lbs  20 reps 85 pounds  20 reps 110 Lbs  20 reps   Nautilus Leg Curls 45 Lbs  20 reps 50 Lbs  20 reps 55 Lbs  20 reps 55 pounds  20 reps 55 pounds  20 reps     MANUAL THERAPY: (15 minutes):  Manual L hamstring stretching, soft tissue work through L quad. Also overpressure into L knee extension. Treatment/Session Summary:    · Response to Treatment: Patient tolerated treatments well with no c/o. ROM and strength continue to improve. · Communication/Consultation:  None today  · Equipment provided today:  None today  · Recommendations/Intent for next treatment session: Next visit will focus on progression of ROM/strengthening exercises as tolerable.     Total Treatment Billable Duration:  45 minutes  PT Patient Time In/Time Out  Time In: 1430  Time Out: 1515  Azalia Mckay, PT    Future Appointments   Date Time Provider Dusty Karimi   9/2/2020  1:45 PM Wilfred Krabbe, PT [facility] SFE   9/9/2020  1:45 PM Wilfred Krabbe, PT [facility] SFE   9/23/2020  2:30 PM SFE DEXA BI  LUNAR DEXA [facility]

## 2020-09-02 ENCOUNTER — HOSPITAL ENCOUNTER (OUTPATIENT)
Dept: PHYSICAL THERAPY | Age: 50
Discharge: HOME | End: 2020-09-02
Payer: COMMERCIAL

## 2020-09-02 PROCEDURE — 97140 MANUAL THERAPY 1/> REGIONS: CPT

## 2020-09-02 PROCEDURE — 97110 THERAPEUTIC EXERCISES: CPT

## 2020-09-02 NOTE — PROGRESS NOTES
John Dumont  : 1970  Primary: Yakov Jaquelin Of [place] Idania*  Secondary:  [facility] [place] [address], [address], [place] [address].  [phone]   [phone]         OUTPATIENT PHYSICAL THERAPY: Daily Treatment Note 2020  Visit Count:  18    ICD-10: Treatment Diagnosis: Pain in left knee (M25.562); Difficulty in walking, not elsewhere classified (R26.2)  Precautions/Allergies:   Patient has no known allergies. TREATMENT PLAN:  Effective Dates: 2020 TO 2020 (90 days). Frequency/Duration: 2 times a week for 90 Day(s)    Pre-treatment Symptoms/Complaints:  L Knee Pain; 2020: Pt states her knee is feeling pretty good. \"A little achy above the kneecap. \"    Pain: Initial:   2/10 Post Session:  2/10   Medications Last Reviewed:  2020  Updated Objective Findings:  None Today  TREATMENT:     THERAPEUTIC EXERCISE: (30 minutes):  Exercises per grid below to improve mobility and strength. Required minimal verbal cues to promote proper body alignment and promote proper body posture. Progressed resistance and repetitions as indicated.    Date:  08-10-20 Date:  2020 Date:  20 Date:  20   Activity/Exercise  Parameters     Quad Sets       Heel Slides with Strap for OP       Gastroc Stretch On Slantboard  3 reps  30 sec holds Slantboard  3 reps  30 second hold On Slantboard  3 reps  30 sec holds On Slantboard  3 reps  30 sec holds   Step-Ups 6 inch step  20 reps  Working on ascending using L quad 6 inch step  20 reps 6 inch step  20 reps 6 inch step  20 reps   TKE with T-band  Black t-band  20 reps Black T-band  20 reps Black T-band  20 reps   Standing Knee Flexion Stretch on Step 10 reps  10 sec holds 10 reps  10 second hold 10 reps  10 sec holds 10 reps  10 sec holds   Airdyne 8 minutes 8 minutes 7 minutes 7 minutes   Nautilus Leg Press 110 Lbs  20  110 pounds  20 reps 110 pounds  20 reps 110 pounds  20 reps   Nautilus Calf Raises on Leg Press Machine 85 Lbs  20 reps 85 pounds  20 reps 110 Lbs  20 reps 110 pounds  20 reps   Nautilus Leg Curls 55 Lbs  20 reps 55 pounds  20 reps 55 pounds  20 reps 55 pounds  20 reps     MANUAL THERAPY: (15 minutes):  Manual L hamstring stretching, soft tissue work through L quad. Also overpressure into L knee extension. Treatment/Session Summary:    · Response to Treatment: Patient tolerated treatments well with no c/o. Plan to continue x1 more visit, then discharge to independent [facility] if pt still doing well. · Communication/Consultation:  None today  · Equipment provided today:  None today  · Recommendations/Intent for next treatment session: Next visit will focus on progression of ROM/strengthening exercises as tolerable.     Total Treatment Billable Duration:  45 minutes  PT Patient Time In/Time Out  Time In: 6636  Time Out: 200 [facility], PT    Future Appointments   Date Time Provider Dusty Karimi   9/9/2020  1:45 PM Derian Patel, PT [facility] SFE   9/23/2020  2:30 PM SFE DEXA BI  LUNAR DEXA [facility] SFE

## 2020-09-09 ENCOUNTER — HOSPITAL ENCOUNTER (OUTPATIENT)
Dept: PHYSICAL THERAPY | Age: 50
Discharge: HOME | End: 2020-09-09
Payer: COMMERCIAL

## 2020-09-09 PROCEDURE — 97140 MANUAL THERAPY 1/> REGIONS: CPT

## 2020-09-09 PROCEDURE — 97110 THERAPEUTIC EXERCISES: CPT

## 2020-09-09 NOTE — PROGRESS NOTES
Jermain Forbes  : 1970  Primary: Pravin [place] [facility] [place] Makayla*  Secondary:  Therapy Center at [place] [address] [facility], [address], [place] [address].  [phone]   [phone]         OUTPATIENT PHYSICAL THERAPY: Daily Treatment Note 2020  Visit Count:  19    ICD-10: Treatment Diagnosis: Pain in left knee (M25.562); Difficulty in walking, not elsewhere classified (R26.2)  Precautions/Allergies:   Patient has no known allergies. TREATMENT PLAN:  Effective Dates: 2020 TO 2020 (90 days). Frequency/Duration: 2 times a week for 90 Day(s)    Pre-treatment Symptoms/Complaints:  L Knee Pain; 2020: Pt states her knee is doing well. Pain: Initial:   2/10 Post Session:  2/10   Medications Last Reviewed:  2020  Updated Objective Findings:  L knee 0-131 degrees  TREATMENT:     THERAPEUTIC EXERCISE: (15 minutes):  Exercises per grid below to improve mobility and strength. Required minimal verbal cues to promote proper body alignment and promote proper body posture. Progressed resistance and repetitions as indicated.    Date:  08-10-20 Date:  2020 Date:  20 Date:  20 Date:  20   Activity/Exercise  Parameters      Quad Sets        Heel Slides with Strap for OP        Gastroc Stretch On Slantboard  3 reps  30 sec holds Slantboard  3 reps  30 second hold On Slantboard  3 reps  30 sec holds On Slantboard  3 reps  30 sec holds On Slantboard  3 reps  30 sec holds   Step-Ups 6 inch step  20 reps  Working on ascending using L quad 6 inch step  20 reps 6 inch step  20 reps 6 inch step  20 reps    TKE with T-band  Black t-band  20 reps Black T-band  20 reps Black T-band  20 reps    Standing Knee Flexion Stretch on Step 10 reps  10 sec holds 10 reps  10 second hold 10 reps  10 sec holds 10 reps  10 sec holds 10 reps  10 sec holds   Airdyne 8 minutes 8 minutes 7 minutes 7 minutes    Nautilus Leg Press 110 Lbs  20  110 pounds  20 reps 110 pounds  20 reps 110 pounds  20 reps 110 pounds  20 reps   Nautilus Calf Raises on Leg Press Machine 85 Lbs  20 reps 85 pounds  20 reps 110 Lbs  20 reps 110 pounds  20 reps    Nautilus Leg Curls 55 Lbs  20 reps 55 pounds  20 reps 55 pounds  20 reps 55 pounds  20 reps 55 pounds  20 reps     MANUAL THERAPY: (15 minutes):  Manual L hamstring stretching, soft tissue work through L quad. Also overpressure into L knee extension. Treatment/Session Summary:    · Response to Treatment: Patient tolerated treatments well with no c/o. Discharge from PT to independent [facility]. · Communication/Consultation:  None today  · Equipment provided today:  None today  · Recommendations/Intent for next treatment session: Next visit will focus on progression of ROM/strengthening exercises as tolerable.     Total Treatment Billable Duration:  30 minutes  PT Patient Time In/Time Out  Time In: 1400  Time Out: 1430  Goyo Salgado PT    Future Appointments   Date Time Provider Dusty Karimi   9/23/2020  2:30 PM SFE DEXA BI [facility] LUNAR DEXA [facility] SFE

## 2020-09-23 ENCOUNTER — HOSPITAL ENCOUNTER (OUTPATIENT)
Dept: MAMMOGRAPHY | Age: 50
Discharge: HOME | End: 2020-09-23
Attending: OBSTETRICS & GYNECOLOGY
Payer: COMMERCIAL

## 2020-09-23 DIAGNOSIS — Z78.0 MENOPAUSE: ICD-10-CM

## 2020-09-23 DIAGNOSIS — Z91.89 AT RISK FOR OSTEOPOROSIS: ICD-10-CM

## 2020-09-23 PROCEDURE — 77080 DXA BONE DENSITY AXIAL: CPT

## 2020-11-11 NOTE — THERAPY DISCHARGE
Alicia Posey  : 1970  Primary: [facility]  Secondary:  [facility] [place] [id number] [facility], [address], [place] [address].  [phone]   [phone]           OUTPATIENT PHYSICAL THERAPY:Discharge Summary 2020   ICD-10: Treatment Diagnosis: Pain in left knee (M25.562); Difficulty in walking, not elsewhere classified (R26.2)  Precautions/Allergies:   Patient has no known allergies. TREATMENT PLAN:  Effective Dates: 2020 TO 2020 (90 days). Frequency/Duration: 2 times a week for 90 Day(s) MEDICAL/REFERRING DIAGNOSIS:  knee   DATE OF ONSET: Surgery 20  REFERRING PHYSICIAN: Lula Mancia MD MD Orders: Evaluate and Treat  Return MD Appointment: 20     INITIAL ASSESSMENT:  Ms. Yoan Mcrae was seen for 19 visits of physical therapy from 20 to 20. She was doing well on her last visit and was discharged. Discharge from PT to independent HEP. PROBLEM LIST (Impacting functional limitations):  1. Decreased Strength  2. Decreased ADL/Functional Activities  3. Increased Pain  4. Decreased Flexibility/Joint Mobility  5. Decreased [place] with Home Exercise Program INTERVENTIONS PLANNED: (Treatment may consist of any combination of the following)  1. Balance Exercise  2. Cold  3. Cryotherapy  4. Electrical Stimulation  5. Gait Training  6. Home Exercise Program (HEP)  7. Manual Therapy  8. Range of Motion (ROM)  9. Therapeutic Exercise/Strengthening     GOALS: (Goals have been discussed and agreed upon with patient.)  Short-Term Functional Goals: Time Frame: 4 weeks  1. Pt will increase ROM L knee 0-120 degrees to assist with ascending/descending stairs and household ADL's (Goal Met)  2. Pt will increase strength L knee 4-/5 to assist with ascending/descending stairs and household ADL's (Goal Met)  3. Pt will be independent with HEP (Goal Met)  Discharge Goals: Time Frame: 12 weeks  1.  Pt will increase strength L knee 4+/5 to assist with ascending/descending stairs and household ADL's (Goal Met)  2. Pt will ascend/descend 10 eight inch steps with bilateral handrails using a reciprocal pattern independently with min to no c/o L knee pain  3. Pt will perform 20 minutes household cleaning activities independently with min to no c/o L knee pain    OUTCOME MEASURE:   Tool Used: Lower Extremity Functional Scale (LEFS)  Score:  Initial: 24/80 Most Recent: 40/80 (Date: 08-27-20)   Interpretation of Score: 20 questions each scored on a 5 point scale with 0 representing \"extreme difficulty or unable to perform\" and 4 representing \"no difficulty\". The lower the score, the greater the functional disability. 80/80 represents no disability. Minimal detectable change is 9 points. MEDICAL NECESSITY:   · Patient is expected to demonstrate progress in strength, range of motion and functional technique to increase independence with ascending/descending stairs and household ADL's. · Patient demonstrates good rehab potential due to higher previous functional level. REASON FOR SERVICES/OTHER COMMENTS:  · Patient continues to require skilled intervention due to decreased ROM/strength L kene with increased pain leading to decreased functional status. Total Duration:  PT Patient Time In/Time Out  Time In: 1400  Time Out: 1430    Rehabilitation Potential For Stated Goals: Good  Regarding Genet Montejo's therapy, I certify that the treatment plan above will be carried out by a therapist or under their direction. Thank you for this referral,  Amina Velásquez, PT     Referring Physician Signature: Britney Mandel MD _______________________________ Date _____________     PAIN/SUBJECTIVE:   Initial:   3-4/10 Post Session:  3/10   HISTORY:   History of Injury/Illness (Reason for Referral):  Pt reports bilateral knee pain of greater than 10 years duration. She had R TKA 01-10-18. She then had L TKA 06-01-20.   She spent 1 night in hospital followed by home health PT until last Thursday. She saw MD today and he said everything was looking good. She rates her current L knee pain 3-4/10. Pt is taking Tramadol at night. Pt is sleeping in the bed. Pt reports difficulties sleeping at night due to L knee pain. Pt stopped using her walker about 10 days post-op. Pt drove to PT today and states she did fine. Past Medical History/Comorbidities:   Ms. Donna Saenz  has a past medical history of Allergic rhinitis, Arthritis, GERD (gastroesophageal reflux disease), and Laryngeal ulceration. Ms. Donna Saenz  has a past surgical history that includes hx  section (); hx abdominal wall defect repair; implant breast silicone/eq (Bilateral, 2010); and implant breast silicone/eq (Bilateral, 2018). Social History/Living Environment:     Pt lives with  and 4 children in a one story house with 3 steps to enter. She is currently having difficulties ascending/descending stairs due to her L knee pain  Prior Level of Function/Work/Activity:  Pt currently working from home   Dominant Side:         RIGHT  Other Clinical Tests:          N/A  Personal Factors:          Sex:  female        Age:  45 y.o. Profession:  Pt works part time for [employer]    Ambulatory/Rehab [employer] Risk Assessment   Risk Factors:       No Risk Factors Identified Ability to Rise from Chair:       (1)  Pushes up, successful in one attempt   Falls Prevention Plan:       No modifications necessary   Total: (5 or greater = High Risk): 1    [facility] [address] [place] [address] States Patent #[id number].  Federal Law prohibits the replication, distribution or use without written permission from [facility] [employer]   Current Medications:       Current Outpatient Medications:     omeprazole (PRILOSEC) 40 mg capsule, TAKE 1 CAPSULE BY MOUTH TWICE A DAY, Disp: 180 Cap, Rfl: 3    iron ps complex/B12/folic acid (FERREX 150 FORTE PO), Take 150 mg by mouth two (2) times a day., Disp: , Rfl:     ascorbic acid, vitamin C, (VITAMIN C) 500 mg tablet, Take 500 mg by mouth two (2) times a day., Disp: , Rfl:     acetaminophen (TYLENOL) 500 mg tablet, Take 1,000 mg by mouth every eight (8) hours as needed for Pain., Disp: , Rfl:     folic acid (FOLVITE) 1 mg tablet, Take 1 mg by mouth daily. , Disp: , Rfl:     oxyCODONE (OXYIR) 5 mg capsule, Take 5 mg by mouth every four (4) hours as needed for Pain., Disp: , Rfl:     aspirin delayed-release 81 mg tablet, Take 81 mg by mouth two (2) times a day., Disp: , Rfl:     traMADoL (ULTRAM) 50 mg tablet, Take 50 mg by mouth every six (6) hours as needed for Pain. Take 1-2 tabs every 6 hours as needed for pain, Disp: , Rfl:     senna (Senna Lax) 8.6 mg tablet, Take 1 Tab by mouth daily. , Disp: , Rfl:     polyethylene glycol (MIRALAX) 17 gram packet, Take 17 g by mouth daily. Mix with 8oz of water, Disp: , Rfl:     butalbital-acetaminophen-caffeine (FIORICET, ESGIC) -40 mg per tablet, Take 1 Tab by mouth every six (6) hours as needed for Headache., Disp: , Rfl:     diclofenac-misoprostol (ARTHROTEC 50)  mg-mcg per tablet, Take 1 Tab by mouth two (2) times a day. Indications: OSTEOARTHRITIS IN PATIENT AT HIGH GASTRIC ULCER RISK, Disp: 60 Tab, Rfl: 5    diphenhydrAMINE (BENADRYL ALLERGY) 25 mg tablet, Take 25 mg by mouth every six (6) hours as needed for Sleep., Disp: , Rfl:    Date Last Reviewed:  06-24-20   Number of Personal Factors/Comorbidities that affect the Plan of Care: 1-2: MODERATE COMPLEXITY   EXAMINATION:   Observation/Orthostatic Postural Assessment:           Forward head, rounded shoulders  Palpation:          Generalized tenderness L knee  ROM:    R knee extension = 0 degrees  R knee flexion = 125 degrees    L knee extension = 0 degrees  L knee flexion = 130 degrees    Strength:    R knee extension = 5/5  R knee flexion = 5/5    L knee extension = 4+/5  L knee flexion = 4+/5    Special Tests:          N/A  Neurological Screen:        Sensation: Diminished sensation lateral L knee  Functional Mobility:         Gait/Ambulation:  Pt walking with moderate antalgic gait and no assistive device        Transfers:  Pt able to transition sit to supine and supine to sit independently    Body Structures Involved:  1. Bones  2. Joints  3. Muscles Body Functions Affected:  1. Sensory/Pain  2. Neuromusculoskeletal Activities and Participation Affected:  1. Mobility  2.  Domestic Life   Number of elements (examined above) that affect the Plan of Care: 3: MODERATE COMPLEXITY   CLINICAL PRESENTATION:   Presentation: Evolving clinical presentation with changing clinical characteristics: MODERATE COMPLEXITY   CLINICAL DECISION MAKING:   Use of outcome tool(s) and clinical judgement create a POC that gives a: Questionable prediction of patient's progress: MODERATE COMPLEXITY

## 2024-01-05 ENCOUNTER — TRANSCRIBE ORDERS (OUTPATIENT)
Dept: SCHEDULING | Age: 54
End: 2024-01-05

## 2024-01-05 DIAGNOSIS — Z12.31 OTHER SCREENING MAMMOGRAM: Primary | ICD-10-CM

## 2024-02-05 ENCOUNTER — HOSPITAL ENCOUNTER (OUTPATIENT)
Dept: MAMMOGRAPHY | Age: 54
Discharge: HOME | End: 2024-02-08
Payer: COMMERCIAL

## 2024-02-05 VITALS — HEIGHT: 67 IN | WEIGHT: 148 LBS | BODY MASS INDEX: 23.23 KG/M2

## 2024-02-05 DIAGNOSIS — Z12.31 OTHER SCREENING MAMMOGRAM: ICD-10-CM

## 2024-02-05 PROCEDURE — 77063 BREAST TOMOSYNTHESIS BI: CPT

## (undated) DEVICE — TO REMOVE ADHESIVE TAPE FROM SKIN.: Brand: PDI® ADHESIVE TAPE REMOVER PAD

## (undated) DEVICE — SWABSTICK MEDICATED ADH PREP BDINE TINC BENZ

## (undated) DEVICE — PROBE PH GAST 6.4FR 18CM PH 2 0CM 15 CHANNELS ES IMPED N

## (undated) DEVICE — 3M™ TRANSPORE™ WHITE SURGICAL TAPE 1534-1, 1 INCH X 10 YARD (2,5CM X 9,1M), 12 ROLLS/CARTON 10 CARTONS/CASE: Brand: 3M™ TRANSPORE™

## (undated) DEVICE — SYRINGE CATH TIP 50ML

## (undated) DEVICE — SYR 3ML LL TIP 1/10ML GRAD --

## (undated) DEVICE — BASIN EMESIS 500CC ROSE 250/CS 60/PLT: Brand: MEDEGEN MEDICAL PRODUCTS, LLC

## (undated) DEVICE — CUP MED 1OZ CLR POLYPR FEED GRAD W/O LID

## (undated) DEVICE — TISSUE FACE KLNX 9X8IN --

## (undated) DEVICE — SOLUTION MNOMTR 80ML ES HI VISC FOR VISCOUS SWALLOW DONE